# Patient Record
Sex: MALE | Race: WHITE | NOT HISPANIC OR LATINO | Employment: OTHER | URBAN - NONMETROPOLITAN AREA
[De-identification: names, ages, dates, MRNs, and addresses within clinical notes are randomized per-mention and may not be internally consistent; named-entity substitution may affect disease eponyms.]

---

## 2023-09-27 NOTE — PROGRESS NOTES
PT Evaluation     Today's date: 10/5/2023  Patient name: Zhane Coburn  : 1970  MRN: [id number]  Referring provider: JULIUS Engel  Dx:   Encounter Diagnosis     ICD-10-CM    1. Back pain with right-sided radiculopathy  M54.10       2. Abnormality of gait and mobility  R26.9                      Assessment  Assessment details: The patient is a 47 y/o male who presents to PT with diagnosis of LBP with R sided radiculopathy. He has complaints of pain in his lower back, R side > L side. He demonstrates deficits with decreased L/S and BLE ROM and strength, decreased postural awareness, decreased flexibility and decreased tolerance to doing his daily tasks at home. He recently began to ambulate with use of RW, prior to onset of back pain he ambulated without AD. He does need min A with use of RW and cues for safety. He had Downs Syndrome and his brother reports that the patient was previously able to do tasks such as self care after toileting, get out of the tub without assist and pull up his pants. His brother states that since his back pain started, the patient needs increased assist from family to complete these tasks. Tenderness was noted over R piriformis (pt would grimace with palpation), no tenderness noted along lower back. The patient would benefit from continued PT to address deficits and improve function. Tx to include ROM, stretching, strengthening, modalities, HEP, pt education, postural ed, lifting/body mechanics, neuro re-ed, balance/proprioception Te, MT and equipment. Impairments: abnormal or restricted ROM, activity intolerance, impaired physical strength, lacks appropriate home exercise program, pain with function, safety issue, poor posture  and poor body mechanics  Other impairment: decreased flexibility  Understanding of Dx/Px/POC: good   Prognosis: good    Goals  STGs:  1. Initiate and complete HEP with assist from family.   2.  Improve BLE strength by 1/2 grade in 4 weeks.  LTGs:  1. Patient to complete HEP consistently with family assist in 16 weeks. 2.  Improve L/S ROM to WNL t/o in 12 weeks to improve function. 3.  Improve B hip ROM to = in seated position in 12 weeks. 4.  Patient will be able to transfer I in/out of tub at home in 12 weeks. 5.  Patient will be able to I dress at home in 12 weeks. 6.  Patient will be able to complete self care after toileting at home in 12 weeks. 7. No TTP note over R piriformis (will be able to palpate without grimace) in 12 weeks. Plan  Plan details: Modalities and therapy interventions prn. Patient would benefit from: skilled physical therapy  Planned modality interventions: cryotherapy and thermotherapy: hydrocollator packs  Planned therapy interventions: abdominal trunk stabilization, IASTM, body mechanics training, neuromuscular re-education, self care, postural training, patient education, strengthening, stretching, therapeutic exercise, therapeutic activities, functional ROM exercises, flexibility, home exercise program, manual therapy, nerve gliding, gait training and transfer training  Frequency: 2x week  Duration in weeks: 12  Plan of Care beginning date: 10/5/2023  Plan of Care expiration date: 12/28/2023  Treatment plan discussed with: patient and family        Subjective Evaluation    History of Present Illness  Mechanism of injury: Patient with Down Syndrome - unable to report history - his brother is primary historian. The patient's brother states that the patient had L sided back pain awhile ago. He notes that the doctor had got him "straightened out" and his back had been feeling better. The patient's brother states that the patient developed back pain and he had a hard time moving. He had gone to see the chiropractor for two visits prior to going to the ER. He notes that he was adjusted twice.   His brother states that on 9/24 the patient had more pain and he was taken to the [facility] ER by ambulance. Per his brother they did a CT scan and the test was (-). He was sent home same day with Flexeral and Ibuprofen. He had a follow up with his PCP on 9/27/23. He was told to stop the Ibuprofen and use Tylenol. He was referred to OPPT and he now presents for his evaluation. He does not have a follow up appointment with his PCP scheduled at this time. He went to [facility] ER on 9/24/23. (His brother notes that the notes from the hospital are wrong, he was seen for his R side LBP). From EMR review of ER visit:  46 y.o. male with a PMH of down-syndrome presenting for left buttock pain s/p chiropractor manipulation. Patient initially was manipulated 2 weeks ago for chronic lower back pain with reported leg length discrepancy and then again last week. Although history is difficult to obtain for the patient secondary to his history of Down syndrome he is able to communicate in a limited manner and further history was obtained from his brother who he lives with. Patient has been ambulating with an antalgic gait secondary to left buttock discomfort. He points to his left buttock when asked where it hurts. Patient has not had any loss of bladder or bowel. Has not had any fevers. Per his brother:  The patient has complaints of pain, his brother notes that he sees tears in his eyes. He did give him a Flexeral last night around 6:00, no medicine since then. His brother notes that he has been having a hard time getting out of the tub and also unable to pull up his pants and perform self care after using the toilet. These activities are things that he was able to do prior to having the back pain. He goes to program during the day, four days a week, six hours a day. He hasn't been going since he developed lower back pain. Patient Goals  Patient goals for therapy: increased motion, decreased pain and increased strength  Patient goal: His brother wants him to return to PLOF at home. Pain  Location: LBP - unable to rate     Social Support  Steps to enter house: yes  4  Stairs in house: no   [facility]  Lives with: Brother     Employment status: not working    Diagnostic Tests  CT scan: abnormal (See above)    FCE comments: From EMR review of CT Scan from 9/24:  Findings: No acute fractures identified. There are multilevel spondylotic   changes. There is mild grade 1 L3-4 anterolisthesis. No significant soft tissue   pathology is identified. Treatments  Previous treatment: chiropractic        Objective     Palpation   Left   No palpable tenderness to the erector spinae and lumbar paraspinals. Right   No palpable tenderness to the erector spinae and lumbar paraspinals. Tenderness of the piriformis. Active Range of Motion     Additional Active Range of Motion Details  Seated:  Able to reach and touch toes. B Rotation limited about 50%. Seated:  Hip flexion limited R > L hip. Able to complete full knee extension in sitting. Hip strength grossly 3+ to 4-/5 t/o. Ambulation   Weight-Bearing Status   Assistive device used: two-wheeled walker    Additional Weight-Bearing Status Details  Presented to IE with RW today. Prior to back pain didn't use AD, as pain progressed he used HHA of family then his brother gave him a RW to use. Ambulation: Level Surfaces   Ambulation with assistive device: minimum assist    Additional Level Surfaces Ambulation Details  Cues for correct use of RW and min assist to help with maneuvering the walker.       Functional Assessment        Comments  Sit to Stand transfers: Independent  Supine to from sit: Min A                 Precautions: Down Syndrome 1:1 treatment - assist from therapist for completion of TE, Fall Risk        Manuals 10/5       B LE - Hamstring, Piriformis 10' ML                               Neuro Re-Ed         MTP/LTP        Sidestepping                                                Ther Ex        NuStep SLR x 3 - Stand        Bridges        LTR        SKTC        S/L Hip Abd        Clamshells        Supine SLR        DKTC w/PBall        Seated Flex Stretch w/PBall        Seated trunk rotations        Ther Activity        STS                Gait Training        Amb in // bars                Modalities        HP/CP prn                           Educated patient's brother to complete HEP/stretches at home. His brother demonstrated understanding of piriformis and hamstring stretches. Access Code: [email or web address]  URL: [email or web address]  Date: 10/05/2023  Prepared by: Jim Maher    Exercises  - Supine Piriformis Stretch with Foot on Ground  - 1 x daily - 7 x weekly - 1 sets - 10 reps - 10" hold  - Supine Hamstring Stretch  - 1 x daily - 7 x weekly - 1 sets - 10 reps - 10" hold

## 2023-10-05 ENCOUNTER — EVALUATION (OUTPATIENT)
Dept: PHYSICAL THERAPY | Facility: CLINIC | Age: 53
End: 2023-10-05
Payer: MEDICARE

## 2023-10-05 DIAGNOSIS — M54.10 BACK PAIN WITH RIGHT-SIDED RADICULOPATHY: Primary | ICD-10-CM

## 2023-10-05 DIAGNOSIS — R26.9 ABNORMALITY OF GAIT AND MOBILITY: ICD-10-CM

## 2023-10-05 PROCEDURE — 97535 SELF CARE MNGMENT TRAINING: CPT | Performed by: PHYSICAL THERAPIST

## 2023-10-05 PROCEDURE — 97161 PT EVAL LOW COMPLEX 20 MIN: CPT | Performed by: PHYSICAL THERAPIST

## 2023-10-05 PROCEDURE — 97140 MANUAL THERAPY 1/> REGIONS: CPT | Performed by: PHYSICAL THERAPIST

## 2023-10-05 NOTE — LETTER
2023    JULIUS Holley  [address] [facility]     Patient: Mo Randle   YOB: 1970   Date of Visit: 10/5/2023     Encounter Diagnosis     ICD-10-CM    1. Back pain with right-sided radiculopathy  M54.10       2. Abnormality of gait and mobility  R26.9           Dear Dr. Rayray Velazco: Thank you for your recent referral of Mo Randle. Please review the attached evaluation summary from Alfred's recent visit. Please verify that you agree with the plan of care by signing the attached order. If you have any questions or concerns, please do not hesitate to call. I sincerely appreciate the opportunity to share in the care of one of your patients and hope to have another opportunity to work with you in the near future. Sincerely,    Daphne Fernández, PT      Referring Provider:      I certify that I have read the below Plan of Care and certify the need for these services furnished under this plan of treatment while under my care. JULIUS Holley  [address] [place] [address]  Via Fax: [phone]          PT Evaluation     Today's date: 10/5/2023  Patient name: Mo Randle  : 1970  MRN: [id number]  Referring provider: JULIUS Holley  Dx:   Encounter Diagnosis     ICD-10-CM    1. Back pain with right-sided radiculopathy  M54.10       2. Abnormality of gait and mobility  R26.9                      Assessment  Assessment details: The patient is a 45 y/o male who presents to PT with diagnosis of LBP with R sided radiculopathy. He has complaints of pain in his lower back, R side > L side. He demonstrates deficits with decreased L/S and BLE ROM and strength, decreased postural awareness, decreased flexibility and decreased tolerance to doing his daily tasks at home. He recently began to ambulate with use of RW, prior to onset of back pain he ambulated without AD.   He does need min A with use of RW and cues for safety. He had Downs Syndrome and his brother reports that the patient was previously able to do tasks such as self care after toileting, get out of the tub without assist and pull up his pants. His brother states that since his back pain started, the patient needs increased assist from family to complete these tasks. Tenderness was noted over R piriformis (pt would grimace with palpation), no tenderness noted along lower back. The patient would benefit from continued PT to address deficits and improve function. Tx to include ROM, stretching, strengthening, modalities, HEP, pt education, postural ed, lifting/body mechanics, neuro re-ed, balance/proprioception Te, MT and equipment. Impairments: abnormal or restricted ROM, activity intolerance, impaired physical strength, lacks appropriate home exercise program, pain with function, safety issue, poor posture  and poor body mechanics  Other impairment: decreased flexibility  Understanding of Dx/Px/POC: good   Prognosis: good    Goals  STGs:  1. Initiate and complete HEP with assist from family. 2.  Improve BLE strength by 1/2 grade in 4 weeks. LTGs:  1. Patient to complete HEP consistently with family assist in 16 weeks. 2.  Improve L/S ROM to WNL t/o in 12 weeks to improve function. 3.  Improve B hip ROM to = in seated position in 12 weeks. 4.  Patient will be able to transfer I in/out of tub at home in 12 weeks. 5.  Patient will be able to I dress at home in 12 weeks. 6.  Patient will be able to complete self care after toileting at home in 12 weeks. 7. No TTP note over R piriformis (will be able to palpate without grimace) in 12 weeks. Plan  Plan details: Modalities and therapy interventions prn.     Patient would benefit from: skilled physical therapy  Planned modality interventions: cryotherapy and thermotherapy: hydrocollator packs  Planned therapy interventions: abdominal trunk stabilization, IASTM, body mechanics training, neuromuscular re-education, self care, postural training, patient education, strengthening, stretching, therapeutic exercise, therapeutic activities, functional ROM exercises, flexibility, home exercise program, manual therapy, nerve gliding, gait training and transfer training  Frequency: 2x week  Duration in weeks: 12  Plan of Care beginning date: 10/5/2023  Plan of Care expiration date: 12/28/2023  Treatment plan discussed with: patient and family        Subjective Evaluation    History of Present Illness  Mechanism of injury: Patient with Down Syndrome - unable to report history - his brother is primary historian. The patient's brother states that the patient had L sided back pain awhile ago. He notes that the doctor had got him "straightened out" and his back had been feeling better. The patient's brother states that the patient developed back pain and he had a hard time moving. He had gone to see the chiropractor for two visits prior to going to the ER. He notes that he was adjusted twice. His brother states that on 9/24 the patient had more pain and he was taken to the [facility] ER by ambulance. Per his brother they did a CT scan and the test was (-). He was sent home same day with Flexeral and Ibuprofen. He had a follow up with his PCP on 9/27/23. He was told to stop the Ibuprofen and use Tylenol. He was referred to OPPT and he now presents for his evaluation. He does not have a follow up appointment with his PCP scheduled at this time. He went to [facility] ER on 9/24/23. (His brother notes that the notes from the hospital are wrong, he was seen for his R side LBP). From EMR review of ER visit:  46 y.o. male with a PMH of down-syndrome presenting for left buttock pain s/p chiropractor manipulation. Patient initially was manipulated 2 weeks ago for chronic lower back pain with reported leg length discrepancy and then again last week.  Although history is difficult to obtain for the patient secondary to his history of Down syndrome he is able to communicate in a limited manner and further history was obtained from his brother who he lives with. Patient has been ambulating with an antalgic gait secondary to left buttock discomfort. He points to his left buttock when asked where it hurts. Patient has not had any loss of bladder or bowel. Has not had any fevers. Per his brother:  The patient has complaints of pain, his brother notes that he sees tears in his eyes. He did give him a Flexeral last night around 6:00, no medicine since then. His brother notes that he has been having a hard time getting out of the tub and also unable to pull up his pants and perform self care after using the toilet. These activities are things that he was able to do prior to having the back pain. He goes to program during the day, four days a week, six hours a day. He hasn't been going since he developed lower back pain. Patient Goals  Patient goals for therapy: increased motion, decreased pain and increased strength  Patient goal: His brother wants him to return to PLOF at home. Pain  Location: LBP - unable to rate     Social Support  Steps to enter house: yes  4  Stairs in house: no   [place] [facility] [place]  Lives with: Brother     Employment status: not working    Diagnostic Tests  CT scan: abnormal (See above)    FCE comments: From EMR review of CT Scan from 9/24:  Findings: No acute fractures identified. There are multilevel spondylotic   changes. There is mild grade 1 L3-4 anterolisthesis. No significant soft tissue   pathology is identified. Treatments  Previous treatment: chiropractic        Objective     Palpation   Left   No palpable tenderness to the erector spinae and lumbar paraspinals. Right   No palpable tenderness to the erector spinae and lumbar paraspinals. Tenderness of the piriformis.      Active Range of Motion     Additional Active Range of Motion Details  Seated:  Able to reach and touch toes. B Rotation limited about 50%. Seated:  Hip flexion limited R > L hip. Able to complete full knee extension in sitting. Hip strength grossly 3+ to 4-/5 t/o. Ambulation   Weight-Bearing Status   Assistive device used: two-wheeled walker    Additional Weight-Bearing Status Details  Presented to IE with RW today. Prior to back pain didn't use AD, as pain progressed he used HHA of family then his brother gave him a RW to use. Ambulation: Level Surfaces   Ambulation with assistive device: minimum assist    Additional Level Surfaces Ambulation Details  Cues for correct use of RW and min assist to help with maneuvering the walker. Functional Assessment        Comments  Sit to Stand transfers: Independent  Supine to from sit: Min A               Precautions: Down Syndrome 1:1 treatment - assist from therapist for completion of TE, Fall Risk        Manuals 10/5       B LE - Hamstring, Piriformis 10' ML                               Neuro Re-Ed         MTP/LTP        Sidestepping                                                Ther Ex        NuStep        SLR x 3 - Stand        Bridges        LTR        SKTC        S/L Hip Abd        Clamshells        Supine SLR        DKTC w/PBall        Seated Flex Stretch w/PBall        Seated trunk rotations        Ther Activity        STS                Gait Training        Amb in // bars                Modalities        HP/CP prn                         Educated patient's brother to complete HEP/stretches at home. His brother demonstrated understanding of piriformis and hamstring stretches. Access Code: [email or web address]  URL: [email or web address]  Date: 10/05/2023  Prepared by: Ok Jordan    Exercises  - Supine Piriformis Stretch with Foot on Ground  - 1 x daily - 7 x weekly - 1 sets - 10 reps - 10" hold  - Supine Hamstring Stretch  - 1 x daily - 7 x weekly - 1 sets - 10 reps - 10" hold

## 2023-10-09 ENCOUNTER — OFFICE VISIT (OUTPATIENT)
Dept: PHYSICAL THERAPY | Facility: CLINIC | Age: 53
End: 2023-10-09
Payer: MEDICARE

## 2023-10-09 DIAGNOSIS — R26.9 ABNORMALITY OF GAIT AND MOBILITY: ICD-10-CM

## 2023-10-09 DIAGNOSIS — M54.10 BACK PAIN WITH RIGHT-SIDED RADICULOPATHY: Primary | ICD-10-CM

## 2023-10-09 PROCEDURE — 97140 MANUAL THERAPY 1/> REGIONS: CPT | Performed by: PHYSICAL THERAPIST

## 2023-10-09 PROCEDURE — 97110 THERAPEUTIC EXERCISES: CPT | Performed by: PHYSICAL THERAPIST

## 2023-10-09 NOTE — PROGRESS NOTES
Daily Note     Today's date: 10/9/2023  Patient name: Cesia Simpson  : 1970  MRN: [id number]  Referring provider: JULIUS Kong  Dx:   Encounter Diagnosis     ICD-10-CM    1. Back pain with right-sided radiculopathy  M54.10       2. Abnormality of gait and mobility  R26.9           Start Time: 1600  Stop Time: 1700  Total time in clinic (min): 60 minutes    Subjective: Per care giver, patient has been doing more activities at home with less dependency. He is continuing to utilize the walker for ambulation. There is noted trendelenburg gait pattern noted during R stance phase of gait. Objective: See treatment diary below      Assessment: Tolerated treatment fair. Patient demonstrated fatigue post treatment and would benefit from continued PT Patient required direct supervision from the therapist for execution of all interventions today. He has noted weakness to the R hip and is unable to perform standing hip abduction to the L due to inability to bear full weight to the R LE for an extended period of time. Plan: Continue per plan of care. Progress treatment as tolerated. Precautions: Down Syndrome 1:1 treatment - assist from therapist for completion of TE, Fall Risk        Manuals 10/5 10/9      B LE - Hamstring, Piriformis 10' ML 30" 5x ea. Neuro Re-Ed         MTP/LTP        Sidestepping                                                Ther Ex        NuStep  L5 6 min      SLR x 3 - Stand        Bridges        LTR        SKTC        LAQ  5" 10x ea.        S/L Hip Abd  Seated OTB 2x10      Clamshells        Supine SLR        DKTC w/PBall        Seated Flex Stretch w/PBall  5" 10x      Seated trunk rotations        Ther Activity        STS  2x10              Gait Training        Amb in // bars  Gait training w/ rolling walker 150', 100'              Modalities        HP/CP prn

## 2023-10-12 ENCOUNTER — APPOINTMENT (OUTPATIENT)
Dept: RADIOLOGY | Facility: CLINIC | Age: 53
End: 2023-10-12
Payer: MEDICARE

## 2023-10-12 ENCOUNTER — OFFICE VISIT (OUTPATIENT)
Dept: PHYSICAL THERAPY | Facility: CLINIC | Age: 53
End: 2023-10-12
Payer: MEDICARE

## 2023-10-12 DIAGNOSIS — R26.9 ABNORMALITY OF GAIT AND MOBILITY: ICD-10-CM

## 2023-10-12 DIAGNOSIS — M54.10 BACK PAIN WITH RIGHT-SIDED RADICULOPATHY: Primary | ICD-10-CM

## 2023-10-12 DIAGNOSIS — M25.552 BILATERAL HIP PAIN: ICD-10-CM

## 2023-10-12 DIAGNOSIS — M25.551 BILATERAL HIP PAIN: ICD-10-CM

## 2023-10-12 PROCEDURE — 97112 NEUROMUSCULAR REEDUCATION: CPT | Performed by: PHYSICAL THERAPIST

## 2023-10-12 PROCEDURE — 97110 THERAPEUTIC EXERCISES: CPT | Performed by: PHYSICAL THERAPIST

## 2023-10-12 PROCEDURE — 73522 X-RAY EXAM HIPS BI 3-4 VIEWS: CPT

## 2023-10-12 PROCEDURE — 97140 MANUAL THERAPY 1/> REGIONS: CPT | Performed by: PHYSICAL THERAPIST

## 2023-10-12 PROCEDURE — 97116 GAIT TRAINING THERAPY: CPT | Performed by: PHYSICAL THERAPIST

## 2023-10-12 NOTE — PROGRESS NOTES
Daily Note     Today's date: 10/12/2023  Patient name: Rosa Nagel  : 1970  MRN: [id number]  Referring provider: JULIUS Sainz  Dx:   Encounter Diagnosis     ICD-10-CM    1. Back pain with right-sided radiculopathy  M54.10       2. Abnormality of gait and mobility  R26.9           Start Time: 09  Stop Time: 1000  Total time in clinic (min): 55 minutes    Subjective: Caregiver reports that patient was fatigued post last session. He continues to report pain to the R posterior hip and caregiver notes difficulties with R stance phase unassisted from the walker. Noted difficulties with tub transfers secondary to increased pain during R stance phase. Objective: See treatment diary below      Assessment: Tolerated treatment fair. Patient demonstrated fatigue post treatment, exhibited good technique with therapeutic exercises, and would benefit from continued PT Patient continues to report posterior R hip pain with prolonged sit to stand transfers and shows decreased stance time to the R LE during gait. He continues to utilize the walker for assistance with ambulation. Patient was challenged with progression of LE strengthening to the program today. Pain was localized to the posterior aspect of the R hip throughout the session today. Cuing required during gait training with rolling walker to increase step length and laurie along with staying inside the walker to help with assistance during gait. Plan: Continue per plan of care. Progress treatment as tolerated. Precautions: Down Syndrome 1:1 treatment - assist from therapist for completion of TE, Fall Risk        Manuals 10/5 10/9 10/12     B LE - Hamstring, Piriformis 10' ML 30" 5x ea. 30" 4x                             Neuro Re-Ed         MTP/LTP        Sidestepping                                                Ther Ex        NuStep  L5 6 min L4 6 min     SLR x 3 - Stand        Bridges        LTR        SKTC        LAQ  5" 10x ea. 2x10 ea.       S/L Hip Abd  Seated OTB 2x10 Seated OTB 2x10     Clamshells        Supine SLR        DKTC w/PBall        Seated Flex Stretch w/PBall  5" 10x 5" 10x     Seated trunk rotations   10x     Ther Activity        STS  2x10 2x10             Gait Training        Amb in // bars  Gait training w/ rolling walker 150', 100' W/ RW x 250'             Modalities        HP/CP prn

## 2023-10-16 ENCOUNTER — OFFICE VISIT (OUTPATIENT)
Dept: PHYSICAL THERAPY | Facility: CLINIC | Age: 53
End: 2023-10-16
Payer: MEDICARE

## 2023-10-16 DIAGNOSIS — M54.10 BACK PAIN WITH RIGHT-SIDED RADICULOPATHY: Primary | ICD-10-CM

## 2023-10-16 DIAGNOSIS — R26.9 ABNORMALITY OF GAIT AND MOBILITY: ICD-10-CM

## 2023-10-16 PROCEDURE — 97140 MANUAL THERAPY 1/> REGIONS: CPT | Performed by: PHYSICAL THERAPIST

## 2023-10-16 PROCEDURE — 97110 THERAPEUTIC EXERCISES: CPT | Performed by: PHYSICAL THERAPIST

## 2023-10-16 NOTE — PROGRESS NOTES
Daily Note     Today's date: 10/16/2023  Patient name: Eneida Luke  : 1970  MRN: [id number]  Referring provider: JULIUS Jaramillo  Dx:   Encounter Diagnosis     ICD-10-CM    1. Back pain with right-sided radiculopathy  M54.10       2. Abnormality of gait and mobility  R26.9           Start Time: 1300  Stop Time: 1400  Total time in clinic (min): 60 minutes    Subjective: Patient's caregiver is reporting decreased mobility and increased pain over the past week. The patient is expressing increased pain with standing and walking and entering therapy via WC today. Objective: See treatment diary below      Assessment: Tolerated treatment poor. Patient demonstrated fatigue post treatment and exhibited good technique with therapeutic exercises Patient tolerated seated and supine exercises well today but had difficulties tolerating sitting off edge of table along with standing/walking activities throughout the session. His caregivers posed the question of further evaluation secondary to increasing pain which I am in agreement with due to increased pain and decreased function. Plan: Progress treatment as tolerated. Precautions: Down Syndrome 1:1 treatment - assist from therapist for completion of TE, Fall Risk        Manuals 10/5 10/9 10/12 10/16    B LE - Hamstring, Piriformis, hip flexion 10' ML 30" 5x ea. 30" 4x 25 min                            Neuro Re-Ed         MTP/LTP        Sidestepping                                                Ther Ex        NuStep  L5 6 min L4 6 min L4 5 min    SLR x 3 - Stand        Bridges    15x    LTR    5" 10x    SKTC        LAQ  5" 10x ea. 2x10 ea.   15x    S/L Hip Abd  Seated OTB 2x10 Seated OTB 2x10     Clamshells        Supine SLR        DKTC w/PBall        Seated Flex Stretch w/PBall  5" 10x 5" 10x     Seated trunk rotations   10x     Ther Activity        STS  2x10 2x10             Gait Training        Amb in // bars  Gait training w/ rolling walker 150', 100' W/ RW x 250'             Modalities        HP/CP prn

## 2023-10-19 ENCOUNTER — APPOINTMENT (OUTPATIENT)
Dept: PHYSICAL THERAPY | Facility: CLINIC | Age: 53
End: 2023-10-19
Payer: MEDICARE

## 2023-10-24 ENCOUNTER — OFFICE VISIT (OUTPATIENT)
Dept: PHYSICAL THERAPY | Facility: CLINIC | Age: 53
End: 2023-10-24
Payer: MEDICARE

## 2023-10-24 DIAGNOSIS — R26.9 ABNORMALITY OF GAIT AND MOBILITY: Primary | ICD-10-CM

## 2023-10-24 PROCEDURE — 97116 GAIT TRAINING THERAPY: CPT

## 2023-10-24 PROCEDURE — 97530 THERAPEUTIC ACTIVITIES: CPT

## 2023-10-24 PROCEDURE — 97110 THERAPEUTIC EXERCISES: CPT

## 2023-10-24 NOTE — PROGRESS NOTES
Daily Note     Today's date: 10/24/2023  Patient name: Candy Dennison  : 1970  MRN: [id number]  Referring provider: JULIUS Brown  Dx:   Encounter Diagnosis     ICD-10-CM    1. Abnormality of gait and mobility  R26.9           Start Time: 1500  Stop Time: 1600  Total time in clinic (min): 60 minutes    Subjective: pt father states that he is trying to get him to walk at home,.but he doesn't want to walk much. Objective: See treatment diary below      Assessment: Tolerated treatment fair. Patient would benefit from continued PT   pt was able to walk 3 6 times at about 100 ft each. We used a roller walker with assist of 2, we also worked on standing tolerance and some seated leg exercises. Plan: Continue per plan of care. Precautions: Down Syndrome 1:1 treatment - assist from therapist for completion of TE, Fall Risk        Manuals 10/5 10/9 10/12 10/16 10/24   B LE - Hamstring, Piriformis, hip flexion 10' ML 30" 5x ea. 30" 4x 25 min                            Neuro Re-Ed         MTP/LTP        Sidestepping                                                Ther Ex        NuStep  L5 6 min L4 6 min L4 5 min L 4 5 min   SLR x 3 - Stand        Bridges    15x    LTR    5" 10x    SKTC        LAQ  5" 10x ea. 2x10 ea.   15x 30 x each   S/L Hip Abd  Seated OTB 2x10 Seated OTB 2x10     Clamshells        Supine SLR        DKTC w/PBall        Seated Flex Stretch w/PBall  5" 10x 5" 10x     Seated trunk rotations   10x     Ther Activity        STS  2x10 2x10  10 x   Standing       3 times at one min each   Gait Training        Amb in // bars  Gait training w/ rolling walker 150', 100' W/ RW x 250'  With RW x 300 ft           Modalities        HP/CP prn

## 2023-11-02 ENCOUNTER — OFFICE VISIT (OUTPATIENT)
Dept: PHYSICAL THERAPY | Facility: CLINIC | Age: 53
End: 2023-11-02
Payer: MEDICARE

## 2023-11-02 DIAGNOSIS — M54.10 BACK PAIN WITH RIGHT-SIDED RADICULOPATHY: ICD-10-CM

## 2023-11-02 DIAGNOSIS — R26.9 ABNORMALITY OF GAIT AND MOBILITY: Primary | ICD-10-CM

## 2023-11-02 PROCEDURE — 97110 THERAPEUTIC EXERCISES: CPT

## 2023-11-02 PROCEDURE — 97530 THERAPEUTIC ACTIVITIES: CPT

## 2023-11-02 PROCEDURE — 97116 GAIT TRAINING THERAPY: CPT

## 2023-11-02 NOTE — PROGRESS NOTES
Daily Note     Today's date: 2023  Patient name: Barry Goltz  : 1970  MRN: [id number]  Referring provider: JULIUS Vaughn  Dx:   Encounter Diagnosis     ICD-10-CM    1. Abnormality of gait and mobility  R26.9       2. Back pain with right-sided radiculopathy  M54.10           Start Time: 1600  Stop Time: 1645  Total time in clinic (min): 45 minutes    Subjective: Pt returns from MD F/U ; they ordered an MRI. Objective: See treatment diary below      Assessment: Tolerated treatment fair. Patient demonstrated fatigue post treatment. Increased sandy to walking until fatigue. Episode of LBP with stand to sit. Plan: Continue per plan of care. Precautions: Down Syndrome 1:1 treatment - assist from therapist for completion of TE, Fall Risk        Manuals 11/2 10/9 10/12 10/16 10/24   B LE - Hamstring, Piriformis, hip flexion np 30" 5x ea. 30" 4x 25 min            Neuro Re-Ed         Sidestepping                                        Ther Ex        NuStep 5m L5 L5 6 min L4 6 min L4 5 min L 4 5 min   Bridges    15x    LTR    5" 10x    SKTC        LAQ 10x  5" 10x ea. 2x10 ea.   15x 30 x each   Hip Abd L2 tb 10x  Seated OTB 2x10 Seated OTB 2x10     Clamshells        Supine SLR        DKTC w/PBall        Seated Flex Stretch w/PBall 10x 5" 5" 10x 5" 10x     Seated trunk rotations   10x     Ther Activity        STS 10x  2x10 2x10  10 x   Standing   Dance 3x    3 times at one min each   Gait Training        Amb in // bars RW 3 x 100ft Gait training w/ rolling walker 150', 100' W/ RW x 250'  With RW x 300 ft           Modalities        HP/CP prn

## 2023-11-06 ENCOUNTER — OFFICE VISIT (OUTPATIENT)
Dept: PHYSICAL THERAPY | Facility: CLINIC | Age: 53
End: 2023-11-06
Payer: MEDICARE

## 2023-11-06 DIAGNOSIS — M54.10 BACK PAIN WITH RIGHT-SIDED RADICULOPATHY: ICD-10-CM

## 2023-11-06 DIAGNOSIS — R26.9 ABNORMALITY OF GAIT AND MOBILITY: Primary | ICD-10-CM

## 2023-11-06 PROCEDURE — 97110 THERAPEUTIC EXERCISES: CPT | Performed by: PHYSICAL THERAPIST

## 2023-11-06 PROCEDURE — 97140 MANUAL THERAPY 1/> REGIONS: CPT | Performed by: PHYSICAL THERAPIST

## 2023-11-06 PROCEDURE — 97116 GAIT TRAINING THERAPY: CPT | Performed by: PHYSICAL THERAPIST

## 2023-11-06 PROCEDURE — 97530 THERAPEUTIC ACTIVITIES: CPT | Performed by: PHYSICAL THERAPIST

## 2023-11-06 NOTE — PROGRESS NOTES
Daily Note     Today's date: 2023  Patient name: Carlito Mondragon  : 1970  MRN: [id number]  Referring provider: JULIUS Luke  Dx:   Encounter Diagnosis     ICD-10-CM    1. Abnormality of gait and mobility  R26.9       2. Back pain with right-sided radiculopathy  M54.10           Start Time: 1600  Stop Time: 1700  Total time in clinic (min): 60 minutes    Subjective: Patient's caregiver reports that he continues to be limited with mobility at home and reports increased R low back and hip pain with WB activities. He also reports that it is hard to have the patient participate in activity at home. Objective: See treatment diary below      Assessment: Tolerated treatment fair. Patient exhibited good technique with therapeutic exercises and would benefit from continued PT Patient had his best session to date today. He demonstrated improved step length and laurie during gait training and had better tolerance for passive stretching and strengthening interventions today. Plan: Continue per plan of care. Progress treatment as tolerated. Precautions: Down Syndrome 1:1 treatment - assist from therapist for completion of TE, Fall Risk        Manuals 11/2 11/6 10/12 10/16 10/24   B LE - Hamstring, Piriformis, hip flexion np 15 min 30" 4x 25 min            Neuro Re-Ed         Sidestepping                                        Ther Ex        NuStep 5m L5 L5 8 min L4 6 min L4 5 min L 4 5 min   Bridges  15x  15x    LTR    5" 10x    SKTC        LAQ 10x  20x ea. 2x10 ea. 15x 30 x each   Hip Abd L2 tb 10x  MRE 15x ea.   Seated OTB 2x10     Clamshells        Supine SLR        DKTC w/PBall        Seated Flex Stretch w/PBall 10x 5"  5" 10x     Seated trunk rotations   10x     Ther Activity        STS 10x  15x 2x10  10 x   Standing   Dance 3x    3 times at one min each   Gait Training        Gait training w/ RW RW 3 x 100ft 100 ft 3x W/ RW x 250'  With RW x 300 ft           Modalities        HP/CP prn

## 2023-11-09 ENCOUNTER — OFFICE VISIT (OUTPATIENT)
Dept: PHYSICAL THERAPY | Facility: CLINIC | Age: 53
End: 2023-11-09
Payer: MEDICARE

## 2023-11-09 DIAGNOSIS — R26.9 ABNORMALITY OF GAIT AND MOBILITY: Primary | ICD-10-CM

## 2023-11-09 DIAGNOSIS — M54.10 BACK PAIN WITH RIGHT-SIDED RADICULOPATHY: ICD-10-CM

## 2023-11-09 PROCEDURE — 97116 GAIT TRAINING THERAPY: CPT | Performed by: PHYSICAL THERAPIST

## 2023-11-09 PROCEDURE — 97140 MANUAL THERAPY 1/> REGIONS: CPT | Performed by: PHYSICAL THERAPIST

## 2023-11-09 PROCEDURE — 97530 THERAPEUTIC ACTIVITIES: CPT | Performed by: PHYSICAL THERAPIST

## 2023-11-09 PROCEDURE — 97110 THERAPEUTIC EXERCISES: CPT | Performed by: PHYSICAL THERAPIST

## 2023-11-09 NOTE — PROGRESS NOTES
Daily Note     Today's date: 2023  Patient name: Zully Terry  : 1970  MRN: [id number]  Referring provider: JULIUS Arellano  Dx:   Encounter Diagnosis     ICD-10-CM    1. Abnormality of gait and mobility  R26.9       2. Back pain with right-sided radiculopathy  M54.10           Start Time: 1600  Stop Time: 1700  Total time in clinic (min): 60 minutes    Subjective: Patient's caregiver reports that the patient has been unmotivated to perform mobility exercises at home. He continues to report low back pain with walking and requests the wheelchair to which the caregiver does provide for transportation. Objective: See treatment diary below      Assessment: Tolerated treatment fair. Patient demonstrated fatigue post treatment, exhibited good technique with therapeutic exercises, and would benefit from continued PT Patient had better tolerance for therapy interventions today with improving mobility noted during passive stretching. He is able to tolerate standing interventions and gait training with less encouragement. Plan: Continue per plan of care. Progress treatment as tolerated. Precautions: Down Syndrome 1:1 treatment - assist from therapist for completion of TE, Fall Risk        Manuals 11/2 11/6 11/9 10/16 10/24   B LE - Hamstring, Piriformis, hip flexion np 15 min 15 min 25 min            Neuro Re-Ed                                         Ther Ex        NuStep 5m L5 L5 8 min L6 8 min L4 5 min L 4 5 min   Bridges  15x 15x 15x    LTR    5" 10x    SKTC        LAQ 10x  20x ea. 20x ea. 15x 30 x each   Hip Abd L2 tb 10x  MRE 15x ea. 15x ea.       Clamshells        Supine SLR   15x ea     DKTC w/PBall        Seated Flex Stretch w/PBall 10x 5"       Seated trunk rotations        Ther Activity        STS 10x  15x 15x  10 x   Side stepping in // bars   2 laps     Standing   Dance 3x    3 times at one min each   Gait Training        Gait training w/ RW RW 3 x 100ft 100 ft 3x 100 ft 2x  With RW x 300 ft           Modalities        HP/CP prn

## 2023-11-13 ENCOUNTER — OFFICE VISIT (OUTPATIENT)
Dept: PHYSICAL THERAPY | Facility: CLINIC | Age: 53
End: 2023-11-13
Payer: MEDICARE

## 2023-11-13 DIAGNOSIS — R26.9 ABNORMALITY OF GAIT AND MOBILITY: Primary | ICD-10-CM

## 2023-11-13 DIAGNOSIS — M54.10 BACK PAIN WITH RIGHT-SIDED RADICULOPATHY: ICD-10-CM

## 2023-11-13 PROCEDURE — 97530 THERAPEUTIC ACTIVITIES: CPT | Performed by: PHYSICAL THERAPIST

## 2023-11-13 PROCEDURE — 97140 MANUAL THERAPY 1/> REGIONS: CPT | Performed by: PHYSICAL THERAPIST

## 2023-11-13 PROCEDURE — 97116 GAIT TRAINING THERAPY: CPT | Performed by: PHYSICAL THERAPIST

## 2023-11-13 PROCEDURE — 97110 THERAPEUTIC EXERCISES: CPT | Performed by: PHYSICAL THERAPIST

## 2023-11-13 NOTE — LETTER
2023    [facility] [address]     Patient: Melissa Alfred   YOB: 1970   Date of Visit: 2023     Encounter Diagnosis     ICD-10-CM    1. Abnormality of gait and mobility  R26.9       2. Back pain with right-sided radiculopathy  M54.10           Dear Dr. Kelsi Contreras: Thank you for your recent referral of Melissa Alfred. Please review the attached evaluation summary from Alfred's recent visit. Please verify that you agree with the plan of care by signing the attached order. If you have any questions or concerns, please do not hesitate to call. I sincerely appreciate the opportunity to share in the care of one of your patients and hope to have another opportunity to work with you in the near future. Sincerely,    Kristian Brown, PT      Referring Provider:      I certify that I have read the below Plan of Care and certify the need for these services furnished under this plan of treatment while under my care. [facility] [address]  Via Fax: [phone]          PT Re-Evaluation     Today's date: 2023  Patient name: Melissa Alfred  : 1970  MRN: [id number]  Referring provider: JULIUS Castillo  Dx:   Encounter Diagnosis     ICD-10-CM    1. Abnormality of gait and mobility  R26.9       2. Back pain with right-sided radiculopathy  M54.10           Start Time: 1500  Stop Time: 1600  Total time in clinic (min): 60 minutes    Assessment  Assessment details: Patient has attended 9 physical therapy visits to date. The only reports of pain are noted during sit to supine transfer. He did well with addition of the recumbent bike today but required assist x2 to get onto and off of the machine. He continues to require verbal encouragement and cuing to improve step length B/L There is noted weakness during L stance phase with trendelenburg gait pattern observed. Impairments: abnormal or restricted ROM, activity intolerance, impaired physical strength, lacks appropriate home exercise program, pain with function, safety issue, poor posture  and poor body mechanics  Other impairment: decreased flexibility    Symptom irritability: lowUnderstanding of Dx/Px/POC: good   Prognosis: good    Goals  STGs:  1. Initiate and complete HEP with assist from family. - Not MET  2. Improve BLE strength by 1/2 grade in 4 weeks. - MET  LTGs:  1. Patient to complete HEP consistently with family assist in 16 weeks. - Not MET  2. Improve L/S ROM to WNL t/o in 12 weeks to improve function. - Progressing  3. Improve B hip ROM to = in seated position in 12 weeks. - Progressing  4. Patient will be able to transfer I in/out of tub at home in 12 weeks. - Not MET  5. Patient will be able to I dress at home in 12 weeks. - Partially MET  6. Patient will be able to complete self care after toileting at home in 12 weeks. - Partially MET  7. No TTP note over R piriformis (will be able to palpate without grimace) in 12 weeks. - MET      Plan  Plan details: Modalities and therapy interventions prn.     Patient would benefit from: skilled physical therapy  Planned modality interventions: cryotherapy and thermotherapy: hydrocollator packs  Planned therapy interventions: abdominal trunk stabilization, IASTM, body mechanics training, neuromuscular re-education, self care, postural training, patient education, strengthening, stretching, therapeutic exercise, therapeutic activities, functional ROM exercises, flexibility, home exercise program, manual therapy, nerve gliding, gait training and transfer training  Frequency: 2x week  Duration in weeks: 6  Plan of Care beginning date: 11/13/2023  Plan of Care expiration date: 12/25/2023  Treatment plan discussed with: patient and family        Subjective Evaluation    History of Present Illness  Mechanism of injury: Patient with Down Syndrome - unable to report history - his brother is primary historian. The patient's brother states that the patient had L sided back pain awhile ago. He notes that the doctor had got him "straightened out" and his back had been feeling better. The patient's brother states that the patient developed back pain and he had a hard time moving. He had gone to see the chiropractor for two visits prior to going to the ER. He notes that he was adjusted twice. His brother states that on 9/24 the patient had more pain and he was [facility] [place] [facility]. Per his brother they did a CT scan and the test was (-). He was sent home same day with Flexeral and Ibuprofen. He had a follow up with his PCP on 9/27/23. He was told to stop the Ibuprofen and use Tylenol. He was referred to OPPT and he now presents for his evaluation. He does not have a follow up appointment with his PCP scheduled at this time. He went to [facility] ER on 9/24/23. (His brother notes that the notes from the hospital are wrong, he was seen for his R side LBP). From EMR review of ER visit:  46 y.o. male with a PMH of down-syndrome presenting for left buttock pain s/p chiropractor manipulation. Patient initially was manipulated 2 weeks ago for chronic lower back pain with reported leg length discrepancy and then again last week. Although history is difficult to obtain for the patient secondary to his history of Down syndrome he is able to communicate in a limited manner and further history was obtained from his brother who he lives with. Patient has been ambulating with an antalgic gait secondary to left buttock discomfort. He points to his left buttock when asked where it hurts. Patient has not had any loss of bladder or bowel. Has not had any fevers. Per his brother:  The patient has complaints of pain, his brother notes that he sees tears in his eyes. He did give him a Flexeral last night around 6:00, no medicine since then.   His brother notes that he has been having a hard time getting out of the tub and also unable to pull up his pants and perform self care after using the toilet. These activities are things that he was able to do prior to having the back pain. He goes to program during the day, four days a week, six hours a day. He hasn't been going since he developed lower back pain. Update 11/13/2023:  Patient continues with reports of intermittent low back and buttock pain. His brother is reporting continued limitation with standing and walking activities secondary to patient's reports of increased pain and self restriction. He is schedule for an MRI on Wednesday and will be following up with the spine physician on Thursday. Patient Goals  Patient goals for therapy: increased motion, decreased pain and increased strength  Patient goal: His brother wants him to return to PLOF at home. Pain  Location: LBP - unable to rate     Social Support  Steps to enter house: yes  4  Stairs in house: no   Lives in: [facility] house  Lives with: Brother     Employment status: not working    Diagnostic Tests  CT scan: abnormal (See above)    FCE comments: From EMR review of CT Scan from 9/24:  Findings: No acute fractures identified. There are multilevel spondylotic   changes. There is mild grade 1 L3-4 anterolisthesis. No significant soft tissue   pathology is identified. Treatments  Previous treatment: chiropractic        Objective     Palpation   Left   No palpable tenderness to the erector spinae and lumbar paraspinals. Right   No palpable tenderness to the erector spinae and lumbar paraspinals. Tenderness of the piriformis. Active Range of Motion     Additional Active Range of Motion Details  Seated:  Able to reach and touch toes. B Rotation limited about 25%. Seated:  Hip flexion limited R > L hip. Able to complete full knee extension in sitting. Hip strength grossly 4/5 t/o.       Ambulation   Weight-Bearing Status   Assistive device used: two-wheeled walker and wheelchair    Additional Weight-Bearing Status Details  Presented to therapy with WC today. Prior to back pain didn't use AD, as pain progressed he used HHA of family then his brother gave him a RW to use. Ambulation: Level Surfaces   Ambulation with assistive device: minimum assist    Additional Level Surfaces Ambulation Details  Cues for correct use of RW and min assist to help with maneuvering the walker. Observational Gait   Increased right stance time. Decreased left stance time. Functional Assessment        Comments  Sit to Stand transfers: Independent  Supine to from sit: Min A                 Precautions: Down Syndrome 1:1 treatment - assist from therapist for completion of TE, Fall Risk           Manuals 11/2 11/6 11/9 11/13 Reassess 10/24   B LE - Hamstring, Piriformis, hip flexion np 15 min 15 min 15 min            Neuro Re-Ed                                         Ther Ex        NuStep for strengthening 5m L5 L5 8 min L6 8 min L6 10 min L 4 5 min   Bike for strengthening    5 min    Bridges  15x 15x 15x    LTR        SKTC        LAQ 10x  20x ea. 20x ea. 30 x each   Hip Abd L2 tb 10x  MRE 15x ea. 15x ea.       Clamshells        Supine SLR   15x ea     DKTC w/PBall        Seated Flex Stretch w/PBall 10x 5"       Seated trunk rotations        Ther Activity        STS 10x  15x 15x 10x 10 x   Side stepping in // bars   2 laps 2 laps    Standing   Dance 3x    3 times at one min each   Gait Training        Gait training w/ RW RW 3 x 100ft 100 ft 3x 100 ft 2x 100ft x1 With RW x 300 ft           Modalities        HP/CP prn

## 2023-11-13 NOTE — PROGRESS NOTES
PT Re-Evaluation     Today's date: 2023  Patient name: Jorge Schneider  : 1970  MRN: [id number]  Referring provider: JULIUS Tirado  Dx:   Encounter Diagnosis     ICD-10-CM    1. Abnormality of gait and mobility  R26.9       2. Back pain with right-sided radiculopathy  M54.10           Start Time: 1500  Stop Time: 1600  Total time in clinic (min): 60 minutes    Assessment  Assessment details: Patient has attended 9 physical therapy visits to date. The only reports of pain are noted during sit to supine transfer. He did well with addition of the recumbent bike today but required assist x2 to get onto and off of the machine. He continues to require verbal encouragement and cuing to improve step length B/L There is noted weakness during L stance phase with trendelenburg gait pattern observed. Impairments: abnormal or restricted ROM, activity intolerance, impaired physical strength, lacks appropriate home exercise program, pain with function, safety issue, poor posture  and poor body mechanics  Other impairment: decreased flexibility    Symptom irritability: lowUnderstanding of Dx/Px/POC: good   Prognosis: good    Goals  STGs:  1. Initiate and complete HEP with assist from family. - Not MET  2. Improve BLE strength by 1/2 grade in 4 weeks. - MET  LTGs:  1. Patient to complete HEP consistently with family assist in 16 weeks. - Not MET  2. Improve L/S ROM to WNL t/o in 12 weeks to improve function. - Progressing  3. Improve B hip ROM to = in seated position in 12 weeks. - Progressing  4. Patient will be able to transfer I in/out of tub at home in 12 weeks. - Not MET  5. Patient will be able to I dress at home in 12 weeks. - Partially MET  6. Patient will be able to complete self care after toileting at home in 12 weeks. - Partially MET  7. No TTP note over R piriformis (will be able to palpate without grimace) in 12 weeks.   - MET      Plan  Plan details: Modalities and therapy interventions prn.    Patient would benefit from: skilled physical therapy  Planned modality interventions: cryotherapy and thermotherapy: hydrocollator packs  Planned therapy interventions: abdominal trunk stabilization, IASTM, body mechanics training, neuromuscular re-education, self care, postural training, patient education, strengthening, stretching, therapeutic exercise, therapeutic activities, functional ROM exercises, flexibility, home exercise program, manual therapy, nerve gliding, gait training and transfer training  Frequency: 2x week  Duration in weeks: 6  Plan of Care beginning date: 11/13/2023  Plan of Care expiration date: 12/25/2023  Treatment plan discussed with: patient and family        Subjective Evaluation    History of Present Illness  Mechanism of injury: Patient with Down Syndrome - unable to report history - his brother is primary historian. The patient's brother states that the patient had L sided back pain awhile ago. He notes that the doctor had got him "straightened out" and his back had been feeling better. The patient's brother states that the patient developed back pain and he had a hard time moving. He had gone to see the chiropractor for two visits prior to going to the ER. He notes that he was adjusted twice. His brother states that on 9/24 the patient had more pain and he was taken to the [facility] ER by ambulance. Per his brother they did a CT scan and the test was (-). He was sent home same day with Flexeral and Ibuprofen. He had a follow up with his PCP on 9/27/23. He was told to stop the Ibuprofen and use Tylenol. He was referred to OPPT and he now presents for his evaluation. He does not have a follow up appointment with his PCP scheduled at this time. He went to [facility] ER on 9/24/23. (His brother notes that the notes from the hospital are wrong, he was seen for his R side LBP).     From EMR review of ER visit:  46 y.o. male with a PMH of down-syndrome presenting for left buttock pain s/p chiropractor manipulation. Patient initially was manipulated 2 weeks ago for chronic lower back pain with reported leg length discrepancy and then again last week. Although history is difficult to obtain for the patient secondary to his history of Down syndrome he is able to communicate in a limited manner and further history was obtained from his brother who he lives with. Patient has been ambulating with an antalgic gait secondary to left buttock discomfort. He points to his left buttock when asked where it hurts. Patient has not had any loss of bladder or bowel. Has not had any fevers. Per his brother:  The patient has complaints of pain, his brother notes that he sees tears in his eyes. He did give him a Flexeral last night around 6:00, no medicine since then. His brother notes that he has been having a hard time getting out of the tub and also unable to pull up his pants and perform self care after using the toilet. These activities are things that he was able to do prior to having the back pain. He goes to program during the day, four days a week, six hours a day. He hasn't been going since he developed lower back pain. Update 11/13/2023:  Patient continues with reports of intermittent low back and buttock pain. His brother is reporting continued limitation with standing and walking activities secondary to patient's reports of increased pain and self restriction. He is schedule for an MRI on Wednesday and will be following up with the spine physician on Thursday. Patient Goals  Patient goals for therapy: increased motion, decreased pain and increased strength  Patient goal: His brother wants him to return to PLOF at home.     Pain  Location: LBP - unable to rate     Social Support  Steps to enter house: yes  4  Stairs in house: no   [place] [facility]  Lives with: Brother     Employment status: not working    Diagnostic Tests  CT scan: abnormal (See above)    FCE comments: From EMR review of CT Scan from 9/24:  Findings: No acute fractures identified. There are multilevel spondylotic   changes. There is mild grade 1 L3-4 anterolisthesis. No significant soft tissue   pathology is identified. Treatments  Previous treatment: chiropractic        Objective     Palpation   Left   No palpable tenderness to the erector spinae and lumbar paraspinals. Right   No palpable tenderness to the erector spinae and lumbar paraspinals. Tenderness of the piriformis. Active Range of Motion     Additional Active Range of Motion Details  Seated:  Able to reach and touch toes. B Rotation limited about 25%. Seated:  Hip flexion limited R > L hip. Able to complete full knee extension in sitting. Hip strength grossly 4/5 t/o. Ambulation   Weight-Bearing Status   Assistive device used: two-wheeled walker and wheelchair    Additional Weight-Bearing Status Details  Presented to therapy with WC today. Prior to back pain didn't use AD, as pain progressed he used HHA of family then his brother gave him a RW to use. Ambulation: Level Surfaces   Ambulation with assistive device: minimum assist    Additional Level Surfaces Ambulation Details  Cues for correct use of RW and min assist to help with maneuvering the walker. Observational Gait   Increased right stance time. Decreased left stance time.      Functional Assessment        Comments  Sit to Stand transfers: Independent  Supine to from sit: Min A                 Precautions: Down Syndrome 1:1 treatment - assist from therapist for completion of TE, Fall Risk           Manuals 11/2 11/6 11/9 11/13 Reassess 10/24   B LE - Hamstring, Piriformis, hip flexion np 15 min 15 min 15 min            Neuro Re-Ed                                         Ther Ex        NuStep for strengthening 5m L5 L5 8 min L6 8 min L6 10 min L 4 5 min   Bike for strengthening    5 min    Bridges  15x 15x 15x    LTR        SKTC        LAQ 10x  20x ea. 20x ea. 30 x each   Hip Abd L2 tb 10x  MRE 15x ea. 15x ea.       Clamshells        Supine SLR   15x ea     DKTC w/PBall        Seated Flex Stretch w/PBall 10x 5"       Seated trunk rotations        Ther Activity        STS 10x  15x 15x 10x 10 x   Side stepping in // bars   2 laps 2 laps    Standing   Dance 3x    3 times at one min each   Gait Training        Gait training w/ RW RW 3 x 100ft 100 ft 3x 100 ft 2x 100ft x1 With RW x 300 ft           Modalities        HP/CP prn

## 2023-11-16 ENCOUNTER — APPOINTMENT (OUTPATIENT)
Dept: PHYSICAL THERAPY | Facility: CLINIC | Age: 53
End: 2023-11-16
Payer: MEDICARE

## 2023-11-20 ENCOUNTER — OFFICE VISIT (OUTPATIENT)
Dept: PHYSICAL THERAPY | Facility: CLINIC | Age: 53
End: 2023-11-20
Payer: MEDICARE

## 2023-11-20 DIAGNOSIS — M54.10 BACK PAIN WITH RIGHT-SIDED RADICULOPATHY: Primary | ICD-10-CM

## 2023-11-20 DIAGNOSIS — R26.9 ABNORMALITY OF GAIT AND MOBILITY: ICD-10-CM

## 2023-11-20 PROCEDURE — 97110 THERAPEUTIC EXERCISES: CPT

## 2023-11-20 PROCEDURE — 97116 GAIT TRAINING THERAPY: CPT

## 2023-11-20 PROCEDURE — 97530 THERAPEUTIC ACTIVITIES: CPT

## 2023-11-20 NOTE — PROGRESS NOTES
Daily Note     Today's date: 2023  Patient name: Shonda Gómez  : 1970  MRN: [id number]  Referring provider: JULIUS Lozoya  Dx:   Encounter Diagnosis     ICD-10-CM    1. Back pain with right-sided radiculopathy  M54.10       2. Abnormality of gait and mobility  R26.9           Start Time: 1600  Stop Time: 1700  Total time in clinic (min): 60 minutes    Subjective: he continues to use a walker for ambulation. Objective: See treatment diary below      Assessment: Tolerated treatment fair. Patient would benefit from continued PT   pt was able to walk 4 x today at 100 feet each. He reports having some pain with passive hamstring stretching on his left , so we stopped. He was hold his glut and said he had pain in his back, we continue to work on his strength and endurance. Plan: Continue per plan of care. Precautions: Down Syndrome 1:1 treatment - assist from therapist for completion of TE, Fall Risk           Manuals  Reassess    B LE - Hamstring, Piriformis, hip flexion np 15 min 15 min 15 min 8 min           Neuro Re-Ed                                         Ther Ex        NuStep for strengthening 5m L5 L5 8 min L6 8 min L6 10 min L 4 5 min   Bike for strengthening    5 min    Bridges  15x 15x 15x 20 x   LTR        SKTC        LAQ 10x  20x ea. 20x ea. 30 x each   Hip Abd L2 tb 10x  MRE 15x ea. 15x ea.    15 x    Clamshells        Supine SLR   15x ea  10 x   DKTC w/PBall        Seated Flex Stretch w/PBall 10x 5"       Seated trunk rotations        Ther Activity        STS 10x  15x 15x 10x 10 x   Side stepping in // bars   2 laps 2 laps    Standing   Dance 3x    2 times at one min each   Gait Training        Gait training w/ RW RW 3 x 100ft 100 ft 3x 100 ft 2x 100ft x1 With RW x 400 ft           Modalities        HP/CP prn

## 2023-11-22 ENCOUNTER — OFFICE VISIT (OUTPATIENT)
Dept: PHYSICAL THERAPY | Facility: CLINIC | Age: 53
End: 2023-11-22
Payer: MEDICARE

## 2023-11-22 DIAGNOSIS — M54.10 BACK PAIN WITH RIGHT-SIDED RADICULOPATHY: ICD-10-CM

## 2023-11-22 DIAGNOSIS — R26.9 ABNORMALITY OF GAIT AND MOBILITY: Primary | ICD-10-CM

## 2023-11-22 PROCEDURE — 97116 GAIT TRAINING THERAPY: CPT

## 2023-11-22 PROCEDURE — 97140 MANUAL THERAPY 1/> REGIONS: CPT

## 2023-11-22 PROCEDURE — 97110 THERAPEUTIC EXERCISES: CPT

## 2023-11-22 NOTE — PROGRESS NOTES
Daily Note     Today's date: 2023  Patient name: Viet Bernardo  : 1970  MRN: [id number]  Referring provider: JULIUS Castro  Dx:   Encounter Diagnosis     ICD-10-CM    1. Abnormality of gait and mobility  R26.9       2. Back pain with right-sided radiculopathy  M54.10           Start Time: 1600  Stop Time: 1700  Total time in clinic (min): 60 minutes    Subjective: pt still using a walker for ambulation. Objective: See treatment diary below      Assessment: Tolerated treatment well. Patient would benefit from continued PT  pt did well with his exercises today and was able to walk 4 times at 100 plus feet each time. He was getting tired with the last walk and had a harder time lifting his feet., He needs cues through out to help do his exercises correctly. We will continue to work on his strength and endurance. Plan: Continue per plan of care. Precautions: Down Syndrome 1:1 treatment - assist from therapist for completion of TE, Fall Risk           Manuals  Reassess    B LE - Hamstring, Piriformis, hip flexion 8 min 15 min 15 min 15 min 8 min           Neuro Re-Ed                                         Ther Ex        NuStep for strengthening 5m L5 L5 8 min L6 8 min L6 10 min L 4 5 min   Bike for strengthening    5 min    Bridges 20 x 15x 15x 15x 20 x   LTR 10x       SKTC        LAQ 10x  20x ea. 20x ea. 30 x each   Hip Abd L2 tb 10x  MRE 15x ea. 15x ea.    15 x    Clamshells        Supine SLR 10 x  15x ea  10 x   DKTC w/PBall        Seated Flex Stretch w/PBall 10x 5"       Seated trunk rotations        Ther Activity        STS 10x  15x 15x 10x 10 x   Side stepping in // bars   2 laps 2 laps    Standing   Dance 3x    2 times at one min each   Gait Training        Gait training w/ RW RW 4 x 100ft 100 ft 3x 100 ft 2x 100ft x1 With RW x 400 ft           Modalities        HP/CP prn

## 2023-11-28 ENCOUNTER — OFFICE VISIT (OUTPATIENT)
Dept: PHYSICAL THERAPY | Facility: CLINIC | Age: 53
End: 2023-11-28
Payer: MEDICARE

## 2023-11-28 DIAGNOSIS — M54.10 BACK PAIN WITH RIGHT-SIDED RADICULOPATHY: ICD-10-CM

## 2023-11-28 DIAGNOSIS — R26.9 ABNORMALITY OF GAIT AND MOBILITY: Primary | ICD-10-CM

## 2023-11-28 PROCEDURE — 97530 THERAPEUTIC ACTIVITIES: CPT | Performed by: PHYSICAL THERAPIST

## 2023-11-28 PROCEDURE — 97110 THERAPEUTIC EXERCISES: CPT | Performed by: PHYSICAL THERAPIST

## 2023-11-28 PROCEDURE — 97112 NEUROMUSCULAR REEDUCATION: CPT | Performed by: PHYSICAL THERAPIST

## 2023-11-28 PROCEDURE — 97140 MANUAL THERAPY 1/> REGIONS: CPT | Performed by: PHYSICAL THERAPIST

## 2023-11-28 NOTE — PROGRESS NOTES
Daily Note     Today's date: 2023  Patient name: Esperanza Das  : 1970  MRN: [id number]  Referring provider: JULIUS Khan  Dx:   Encounter Diagnosis     ICD-10-CM    1. Abnormality of gait and mobility  R26.9       2. Back pain with right-sided radiculopathy  M54.10           Start Time: 1300  Stop Time: 1400  Total time in clinic (min): 60 minutes    Subjective: Patient's brother reports that the patient has returned to his program and has been walking around more per their report. He entering therapy today in a WC pushed by his brother. Objective: See treatment diary below      Assessment: Tolerated treatment well. Patient demonstrated fatigue post treatment, exhibited good technique with therapeutic exercises, and would benefit from continued PT Patient with better overall tolerance for therapy interventions today with improved gait pattern noted and improved motivation. Continued to focus on progression of mobility and strength for improved tolerance with functional activities. Plan: Continue per plan of care. Progress treatment as tolerated. Precautions: Down Syndrome 1:1 treatment - assist from therapist for completion of TE, Fall Risk           Manuals  Reassess    B LE - Hamstring, Piriformis, hip flexion 8 min 10 min 15 min 15 min 8 min           Neuro Re-Ed                                         Ther Ex        NuStep for strengthening 5m L5 L5 7 min L6 8 min L6 10 min L 4 5 min   Bike for strengthening    5 min    Bridges 20 x 20x 15x 15x 20 x   LTR 10x       SKTC        LAQ 10x  20x 20x ea. 30 x each   Hip Abd L2 tb 10x   15x ea.    15 x    Clamshells        Supine SLR 10 x  15x ea  10 x   DKTC w/PBall        Seated Flex Stretch w/PBall 10x 5"       Seated trunk rotations        Ther Activity        STS 10x  15x 15x 10x 10 x   Side stepping in // bars   2 laps 2 laps    Ball toss in chair  Big red ball 20x      Standing   Dance 3x 2 times at one min each   Gait Training        Gait training w/ RW RW 4 x 100ft RW 3x200 ft 100 ft 2x 100ft x1 With RW x 400 ft           Modalities        HP/CP prn

## 2023-11-30 ENCOUNTER — OFFICE VISIT (OUTPATIENT)
Dept: PHYSICAL THERAPY | Facility: CLINIC | Age: 53
End: 2023-11-30
Payer: MEDICARE

## 2023-11-30 DIAGNOSIS — M54.10 BACK PAIN WITH RIGHT-SIDED RADICULOPATHY: Primary | ICD-10-CM

## 2023-11-30 DIAGNOSIS — R26.9 ABNORMALITY OF GAIT AND MOBILITY: ICD-10-CM

## 2023-11-30 PROCEDURE — 97110 THERAPEUTIC EXERCISES: CPT

## 2023-11-30 PROCEDURE — 97140 MANUAL THERAPY 1/> REGIONS: CPT

## 2023-11-30 PROCEDURE — 97530 THERAPEUTIC ACTIVITIES: CPT

## 2023-12-04 ENCOUNTER — OFFICE VISIT (OUTPATIENT)
Dept: PHYSICAL THERAPY | Facility: CLINIC | Age: 53
End: 2023-12-04
Payer: MEDICARE

## 2023-12-04 DIAGNOSIS — R26.9 ABNORMALITY OF GAIT AND MOBILITY: Primary | ICD-10-CM

## 2023-12-04 DIAGNOSIS — M54.10 BACK PAIN WITH RIGHT-SIDED RADICULOPATHY: ICD-10-CM

## 2023-12-04 PROCEDURE — 97140 MANUAL THERAPY 1/> REGIONS: CPT

## 2023-12-04 PROCEDURE — 97530 THERAPEUTIC ACTIVITIES: CPT

## 2023-12-04 PROCEDURE — 97110 THERAPEUTIC EXERCISES: CPT

## 2023-12-04 NOTE — PROGRESS NOTES
Daily Note     Today's date: 2023  Patient name: Marianela Rowe  : 1970  MRN: [id number]  Referring provider: JULIUS Fagan  Dx:   Encounter Diagnosis     ICD-10-CM    1. Abnormality of gait and mobility  R26.9       2. Back pain with right-sided radiculopathy  M54.10           Start Time: 1400  Stop Time: 1500  Total time in clinic (min): 60 minutes    Subjective:  he was out today with his school and did some walking. Objective: See treatment diary below      Assessment: Tolerated treatment well. Patient would benefit from continued PT   pt was able to walk 4 x today at 100 plus feet for each. He did better with sit to stand, transfers and over all balance and control when walking. We will continue to work on his gait and strength,. Plan: Continue per plan of care. Precautions: Down Syndrome 1:1 treatment - assist from therapist for completion of TE, Fall Risk           Manuals    B LE - Hamstring, Piriformis, hip flexion 8 min 10 min 15 min 15 min 8 min           Neuro Re-Ed                                         Ther Ex        NuStep for strengthening 5m L5 L5 7 min L6 8 min L6 10 min L 4 5 min   Bike for strengthening    5 min    Bridges 20 x 20x 15x 15x 20 x   LTR 10x       SKTC        LAQ 10x  20x 20x ea. 20 x 30 x each   Hip Abd L2 tb 10x   15x ea.    15 x    Clamshells        Supine SLR 10 x  15x ea 15 x  10 x   DKTC w/PBall        Seated Flex Stretch w/PBall 10x 5"       Seated trunk rotations        Ther Activity        STS 10x  15x 15x 20 x 10 x   Side stepping in // bars   2 laps     Ball toss in chair  Big red ball 20x  Big red 40 x     Standing   Dance 3x    2 times at one min each   Gait Training        Gait training w/ RW RW 4 x 100ft RW 3x200 ft 100 ft 4 x  100ft x4 With RW x 400 ft           Modalities        HP/CP prn

## 2023-12-06 ENCOUNTER — OFFICE VISIT (OUTPATIENT)
Dept: PHYSICAL THERAPY | Facility: CLINIC | Age: 53
End: 2023-12-06
Payer: MEDICARE

## 2023-12-06 DIAGNOSIS — M54.10 BACK PAIN WITH RIGHT-SIDED RADICULOPATHY: Primary | ICD-10-CM

## 2023-12-06 DIAGNOSIS — R26.9 ABNORMALITY OF GAIT AND MOBILITY: ICD-10-CM

## 2023-12-06 PROCEDURE — 97110 THERAPEUTIC EXERCISES: CPT

## 2023-12-06 PROCEDURE — 97116 GAIT TRAINING THERAPY: CPT

## 2023-12-06 PROCEDURE — 97140 MANUAL THERAPY 1/> REGIONS: CPT

## 2023-12-11 ENCOUNTER — OFFICE VISIT (OUTPATIENT)
Dept: PHYSICAL THERAPY | Facility: CLINIC | Age: 53
End: 2023-12-11
Payer: MEDICARE

## 2023-12-11 DIAGNOSIS — M54.10 BACK PAIN WITH RIGHT-SIDED RADICULOPATHY: ICD-10-CM

## 2023-12-11 DIAGNOSIS — R26.9 ABNORMALITY OF GAIT AND MOBILITY: Primary | ICD-10-CM

## 2023-12-11 PROCEDURE — 97110 THERAPEUTIC EXERCISES: CPT

## 2023-12-11 PROCEDURE — 97530 THERAPEUTIC ACTIVITIES: CPT

## 2023-12-11 PROCEDURE — 97116 GAIT TRAINING THERAPY: CPT

## 2023-12-11 NOTE — PROGRESS NOTES
Daily Note     Today's date: 2023  Patient name: Eneida Luke  : 1970  MRN: [id number]  Referring provider: JULIUS Jaramillo  Dx:   Encounter Diagnosis     ICD-10-CM    1. Abnormality of gait and mobility  R26.9       2. Back pain with right-sided radiculopathy  M54.10           Start Time: 1400  Stop Time: 1500  Total time in clinic (min): 60 minutes    Subjective: pt was in good spirits and came into therapy today in the wheel chair. He will get his injection on Dec 22. Objective: See treatment diary below      Assessment: Tolerated treatment well. Patient would benefit from continued PT   pt was able to walk 3 times today at 150 feet each., He had good tolerance with all exercises and is showing improvement with endurance and strength,. Pt was able to use 4 # today for SAQ . We will continue to work on his walking and strength,. We held on his stretching today at his brothers request.,      Plan: Continue per plan of care. Precautions: Down Syndrome 1:1 treatment - assist from therapist for completion of TE, Fall Risk           Manuals    B LE - Hamstring, Piriformis, hip flexion  10 min 15 min 15 min 8 min           Neuro Re-Ed                                         Ther Ex        NuStep for strengthening 5m L5 L5 7 min L6 8 min L6 10 min L 4 10 min   Bike for strengthening    5 min    Bridges 20 x 20x 15x 15x 30 x   LTR 10x       SKTC 10 x       LAQ 40 x  3 # 20x 20x ea. 20 x 30 x each 2#   Hip Abd   15x ea.    15 x    Clamshells        Supine SLR 10 x  15x ea 15 x  15x   DKTC w/PBall        Seated Flex Stretch w/PBall 10x 5"    10 x 5 "   Supine abduction 20 x  OTB        Seated trunk rotations        Ther Activity        STS 10x 15x 15x 20 x 10 x   Side stepping in // bars   2 laps     Ball toss in chair 40 x Big red ball 20x  Big red 40 x     Standing   2 min x two    2 times at one min each   Gait Training        Gait training w/ RW RW 3 x 150 ft RW 3x200 ft 100 ft 4 x  100ft x4 With RW x 400 ft           Modalities        HP/CP prn

## 2023-12-13 ENCOUNTER — OFFICE VISIT (OUTPATIENT)
Dept: PHYSICAL THERAPY | Facility: CLINIC | Age: 53
End: 2023-12-13
Payer: MEDICARE

## 2023-12-13 DIAGNOSIS — M54.10 BACK PAIN WITH RIGHT-SIDED RADICULOPATHY: Primary | ICD-10-CM

## 2023-12-13 DIAGNOSIS — R26.9 ABNORMALITY OF GAIT AND MOBILITY: ICD-10-CM

## 2023-12-13 PROCEDURE — 97530 THERAPEUTIC ACTIVITIES: CPT

## 2023-12-13 PROCEDURE — 97110 THERAPEUTIC EXERCISES: CPT

## 2023-12-13 PROCEDURE — 97140 MANUAL THERAPY 1/> REGIONS: CPT

## 2023-12-13 NOTE — PROGRESS NOTES
Daily Note     Today's date: 2023  Patient name: Luz Maria Schwartz  : 1970  MRN: [id number]  Referring provider: JULIUS Webster  Dx:   Encounter Diagnosis     ICD-10-CM    1. Back pain with right-sided radiculopathy  M54.10       2. Abnormality of gait and mobility  R26.9           Start Time: 1400  Stop Time: 1500  Total time in clinic (min): 60 minutes    Subjective: Pt continues to use the walker for ambulation. Objective: See treatment diary below      Assessment: Tolerated treatment well. Patient would benefit from continued PT  we continue to work on his strength , endurance and gait. Pt is able to walk 3 x in the clinic today for 150 feet each. Pt still using the walker for his walking. Plan: Continue per plan of care. Precautions: Down Syndrome 1:1 treatment - assist from therapist for completion of TE, Fall Risk           Manuals    B LE - Hamstring, Piriformis, hip flexion  10 15 min 15 min 8 min           Neuro Re-Ed                                         Ther Ex        NuStep for strengthening 5m L5 L5 7 min L6 8 min L6 10 min L 4 10 min   Bike for strengthening    5 min    Bridges 20 x 20x 15x 15x 30 x   LTR 10x       SKTC 10 x       LAQ 40 x  3 # 20x  3 #  20x ea. 20 x 30 x each 2#   Hip Abd   15x ea.    15 x    Clamshells        Supine SLR 10 x  15x ea 15 x  15x   DKTC w/PBall        Seated Flex Stretch w/PBall 10x 5"    10 x 5 "   Supine abduction 20 x  OTB        Seated trunk rotations        Ther Activity        STS 10x 15x 15x 20 x 10 x   Side stepping in // bars   2 laps     Ball toss in chair 40 x Big red ball 20x  Big red 40 x     Standing   2 min x two    2 times at one min each   Gait Training        Gait training w/ RW RW 3 x 150 ft RW 3x200 ft 100 ft 4 x  100ft x4 With RW x 400 ft           Modalities        HP/CP prn

## 2023-12-18 ENCOUNTER — APPOINTMENT (OUTPATIENT)
Dept: PHYSICAL THERAPY | Facility: CLINIC | Age: 53
End: 2023-12-18
Payer: MEDICARE

## 2023-12-20 ENCOUNTER — OFFICE VISIT (OUTPATIENT)
Dept: PHYSICAL THERAPY | Facility: CLINIC | Age: 53
End: 2023-12-20
Payer: MEDICARE

## 2023-12-20 DIAGNOSIS — M54.10 BACK PAIN WITH RIGHT-SIDED RADICULOPATHY: ICD-10-CM

## 2023-12-20 DIAGNOSIS — R26.9 ABNORMALITY OF GAIT AND MOBILITY: Primary | ICD-10-CM

## 2023-12-20 PROCEDURE — 97530 THERAPEUTIC ACTIVITIES: CPT

## 2023-12-20 PROCEDURE — 97110 THERAPEUTIC EXERCISES: CPT

## 2023-12-20 PROCEDURE — 97116 GAIT TRAINING THERAPY: CPT

## 2023-12-20 NOTE — PROGRESS NOTES
"Daily Note     Today's date: 2023  Patient name: Alfred Lawson  : 1970  MRN: [id number]  Referring provider: Patria Tristan CRNP  Dx:   Encounter Diagnosis     ICD-10-CM    1. Abnormality of gait and mobility  R26.9       2. Back pain with right-sided radiculopathy  M54.10           Start Time: 1400  Stop Time: 1500  Total time in clinic (min): 60 minutes    Subjective: pt reports no pain today . He still uses a walker for ambulation with one assist.       Objective: See treatment diary below      Assessment: Tolerated treatment well. Patient would benefit from continued PT    PT was able to walk 4 x for 100 ft each. We continue to work on his strength and gait. We worked on strengthening in the chair and mat table.  He is showing improvement with his gait and is able to walk further before a rest.       Plan: Continue per plan of care.      Precautions: Down Syndrome 1:1 treatment - assist from therapist for completion of TE, Fall Risk           Manuals    B LE - Hamstring, Piriformis, hip flexion  10 15 min 15 min 8 min           Neuro Re-Ed                                         Ther Ex        NuStep for strengthening 5m L5 L5 7 min L6 8 min L6 10 min L 4 10 min   Bike for strengthening    5 min    Bridges 20 x 20x 15x 15x 30 x   LTR 10x       SKTC 10 x       LAQ 40 x  3 # 20x  3 #  20x ea.  20 x 30 x each 2#   Hip Abd   15x ea.   15 x    Clamshells        Supine SLR 10 x  15x ea 15 x  15x   DKTC w/PBall        Seated Flex Stretch w/PBall 10x 5\"    10 x 5 \"   Supine abduction 20 x  OTB   20 x OTB     Seated trunk rotations        Ther Activity        STS 10x 15x 15x 20 x 10 x   Side stepping in // bars        Ball toss in chair 40 x Big red ball 20x Red [id number] X Big red 40 x     Standing   2 min x two  15X  2 times at one min each   Gait Training        Gait training w/ RW RW 3 x 150 ft RW 3x200 ft 100 ft 4 x  100ft x4 With RW x 400 ft           Modalities        HP/CP " prn

## 2023-12-27 ENCOUNTER — OFFICE VISIT (OUTPATIENT)
Dept: PHYSICAL THERAPY | Facility: CLINIC | Age: 53
End: 2023-12-27
Payer: MEDICARE

## 2023-12-27 DIAGNOSIS — M54.10 BACK PAIN WITH RIGHT-SIDED RADICULOPATHY: ICD-10-CM

## 2023-12-27 DIAGNOSIS — R26.9 ABNORMALITY OF GAIT AND MOBILITY: Primary | ICD-10-CM

## 2023-12-27 PROCEDURE — 97116 GAIT TRAINING THERAPY: CPT | Performed by: PHYSICAL THERAPIST

## 2023-12-27 PROCEDURE — 97530 THERAPEUTIC ACTIVITIES: CPT | Performed by: PHYSICAL THERAPIST

## 2023-12-27 PROCEDURE — 97110 THERAPEUTIC EXERCISES: CPT | Performed by: PHYSICAL THERAPIST

## 2023-12-27 PROCEDURE — 97140 MANUAL THERAPY 1/> REGIONS: CPT | Performed by: PHYSICAL THERAPIST

## 2023-12-27 NOTE — LETTER
2023    JULIUS Eubanks  [address] [facility] [address]    Patient: Alfred Lawson   YOB: 1970   Date of Visit: 2023     Encounter Diagnosis     ICD-10-CM    1. Abnormality of gait and mobility  R26.9       2. Back pain with right-sided radiculopathy  M54.10           Dear Dr. Tristan:    Thank you for your recent referral of Alfred Lawson. Please review the attached evaluation summary from Alfred's recent visit.     Please verify that you agree with the plan of care by signing the attached order.     If you have any questions or concerns, please do not hesitate to call.     I sincerely appreciate the opportunity to share in the care of one of your patients and hope to have another opportunity to work with you in the near future.       Sincerely,    Fede Rasheed, PT      Referring Provider:      I certify that I have read the below Plan of Care and certify the need for these services furnished under this plan of treatment while under my care.                    JULIUS Eubanks  [address] [facility] [address]  Via Fax: [phone]          PT Re-Evaluation     Today's date: 2023  Patient name: Alfred Lawson  : 1970  MRN: [id number]  Referring provider: Patria Tristan CRNP  Dx:   Encounter Diagnosis     ICD-10-CM    1. Abnormality of gait and mobility  R26.9       2. Back pain with right-sided radiculopathy  M54.10           Start Time: 1400  Stop Time: 1500  Total time in clinic (min): 60 minutes    Assessment  Assessment details: Patient has attended 18 physical therapy visits to date. Patient is demonstrating improved lumbar mobility and improving strength to the Les. There is noted improvement to gait pattern with HH assistance today in place of rolling walker for assistance with ambulation. There continues to be mild trendelenburg lurch to the L LE during stance phase which does become more prominent as the patient fatigues.  Pain levels are much more controlled since the last evaluation.   Impairments: abnormal or restricted ROM, activity intolerance, impaired physical strength, lacks appropriate home exercise program, pain with function, safety issue, poor posture  and poor body mechanics  Other impairment: decreased flexibility    Symptom irritability: lowUnderstanding of Dx/Px/POC: good   Prognosis: good    Goals  STGs:  1.  Initiate and complete HEP with assist from family. - MET  2.  Improve BLE strength by 1/2 grade in 4 weeks. - MET  LTGs:  1.  Patient to complete HEP consistently with family assist in 12 weeks. - Not MET  2.  Improve L/S ROM to WNL t/o in 12 weeks to improve function. - Progressing  3.  Improve B hip ROM to = in seated position in 12 weeks. - Progressing  4.  Patient will be able to transfer I in/out of tub at home in 12 weeks. - MET  5.  Patient will be able to I dress at home in 12 weeks. - MET  6.  Patient will be able to complete self care after toileting at home in 12 weeks. - MET  7.  No TTP note over R piriformis (will be able to palpate without grimace) in 12 weeks.  - MET      Plan  Plan details: Patient will continue to benefit from skilled physical therapy to address the functional deficits that were identified during the evaluation today. We will continue to progress the therapy program to address these functional deficits and achieve the established goals.   Patient informed that from this point forward, to ensure adherence to the aforementioned plan of care, all or some of the treatment may be performed and carried out by a Physical Therapy Assistant (PTA) with supervision from a licensed Physical Therapist (PT) in accordance with [facility] Physical Therapy Practice Act.        Patient would benefit from: skilled physical therapy  Planned modality interventions: cryotherapy and thermotherapy: hydrocollator packs  Planned therapy interventions: abdominal trunk stabilization, IASTM, body  "mechanics training, neuromuscular re-education, self care, postural training, patient education, strengthening, stretching, therapeutic exercise, therapeutic activities, functional ROM exercises, flexibility, home exercise program, manual therapy, nerve gliding, gait training and transfer training  Frequency: 2x week  Duration in weeks: 4  Plan of Care beginning date: 12/27/2023  Plan of Care expiration date: 1/24/2024  Treatment plan discussed with: patient and family        Subjective Evaluation    History of Present Illness  Mechanism of injury: Patient with Down Syndrome - unable to report history - his brother is primary historian.    The patient's brother states that the patient had L sided back pain awhile ago.  He notes that the doctor had got him \"straightened out\" and his back had been feeling better.    The patient's brother states that the patient developed back pain and he had a hard time moving.  He had gone to see the chiropractor for two visits prior to going to the ER.  He notes that he was adjusted twice.  His brother states that on 9/24 the patient had more pain and he was taken to the hospital  ER by ambulance.  Per his brother they did a CT scan and the test was (-).  He was sent home same day with Flexeral and Ibuprofen.  He had a follow up with his PCP on 9/27/23.  He was told to stop the Ibuprofen and use Tylenol.  He was referred to OPPT and he now presents for his evaluation.      He does not have a follow up appointment with his PCP scheduled at this time.       He went to [facility] ER on 9/24/23.  (His brother notes that the notes from the hospital are wrong, he was seen for his R side LBP).    From EMR review of ER visit:  53 y.o. male with a PMH of down-syndrome presenting for left buttock pain s/p chiropractor manipulation. Patient initially was manipulated 2 weeks ago for chronic lower back pain with reported leg length discrepancy and then again last week. Although history is " difficult to obtain for the patient secondary to his history of Down syndrome he is able to communicate in a limited manner and further history was obtained from his brother who he lives with. Patient has been ambulating with an antalgic gait secondary to left buttock discomfort. He points to his left buttock when asked where it hurts. Patient has not had any loss of bladder or bowel. Has not had any fevers.    Per his brother:  The patient has complaints of pain, his brother notes that he sees tears in his eyes.  He did give him a Flexeral last night around 6:00, no medicine since then.  His brother notes that he has been having a hard time getting out of the tub and also unable to pull up his pants and perform self care after using the toilet.  These activities are things that he was able to do prior to having the back pain.      He goes to program during the day, four days a week, six hours a day.  He hasn't been going since he developed lower back pain.      Update 2023:  Patient continues with reports of intermittent low back and buttock pain. His brother is reporting continued limitation with standing and walking activities secondary to patient's reports of increased pain and self restriction. He is schedule for an MRI on Wednesday and will be following up with the spine physician on Thursday.     Update 2023:  Patient is returning to therapy today with reports of overall improvements over the past month. His brother is reporting overall improvements with his adls and notes that he has been using the wheelchair less and walking with his walker more. He also notes that he has been ascending stairs with a reciprocal gait pattern.   Patient Goals  Patient goals for therapy: increased motion, decreased pain and increased strength  Patient goal: His brother wants him to return to PLOF at home.    Pain  Current pain ratin  At best pain ratin  At worst pain ratin  Location: LBP  Quality:  discomfort    Social Support  Steps to enter house: yes  4  Stairs in house: no   Lives in: one-story house  Lives with: Brother     Employment status: not working    Diagnostic Tests  CT scan: abnormal (See above)    FCE comments: From EMR review of CT Scan from 9/24:  Findings: No acute fractures identified. There are multilevel spondylotic   changes. There is mild grade 1 L3-4 anterolisthesis. No significant soft tissue   pathology is identified.   Treatments  Previous treatment: chiropractic        Objective     Palpation   Left   No palpable tenderness to the erector spinae and lumbar paraspinals.     Right   No palpable tenderness to the erector spinae and lumbar paraspinals.   Tenderness of the piriformis.     Active Range of Motion     Lumbar   Flexion:  Restriction level: minimal  Extension:  Restriction level: moderate  Left lateral flexion:  Restriction level: minimal  Right lateral flexion:  Restriction level: minimal  Left rotation:  Restriction level: minimal  Right rotation:  Restriction level: minimal    Additional Active Range of Motion Details  Seated:  Hip flexion limited R > L hip.  Able to complete full knee extension in sitting.      Hip strength grossly 4/5 t/o.      Strength/Myotome Testing     Left Hip   Planes of Motion   Flexion: 4    Right Hip   Planes of Motion   Flexion: 4    Left Knee   Flexion: 4  Extension: 4    Right Knee   Flexion: 4  Extension: 4    Left Ankle/Foot   Dorsiflexion: 4  Plantar flexion: 4  Great toe extension: 4    Right Ankle/Foot   Dorsiflexion: 4  Plantar flexion: 4  Great toe extension: 4    Ambulation   Weight-Bearing Status   Assistive device used: none    Additional Weight-Bearing Status Details  Patient presents to therapy with front wheeled walker. Gait assessment is with hand held assist.     Ambulation: Level Surfaces   Ambulation with assistive device: contact guard assist    Observational Gait   Increased right stance time. Decreased left stance time.  "    Additional Observational Gait Details  Gait is with HH assist. There is noted IR of the B/L Les with L>R. Less lurch is noted during stance phase of the L LE     Functional Assessment        Comments  Sit to Stand transfers: Independent  Supine to from sit: Min A                 Precautions: Down Syndrome 1:1 treatment - assist from therapist for completion of TE, Fall Risk           Manuals 12/11 12/13 12/20 12/27 Reassess   FOTO 54 SC 12/6   B LE - Hamstring, Piriformis, hip flexion  10 15 min 15 min 8 min           Neuro Re-Ed                                         Ther Ex        NuStep for strengthening 5m L5 L5 7 min L6 8 min L6 10 min L 4 10 min   Bridges 20 x 20x 15x  30 x   LAQ 40 x  3 # 20x  3 #  20x ea.  3# 21x ea.  30 x each 2#   Hip Abd   15x ea.   15 x    Clamshells        Supine SLR 10 x  15x ea  15x   Seated Flex Stretch w/PBall 10x 5\"    10 x 5 \"   Supine abduction 20 x  OTB   20 x OTB     Seated adductor squeeze         Seated trunk rotations        Ther Activity        STS 10x 15x 15x  10 x   Side stepping in // bars    4 laps    Ball toss in chair 40 x Big red ball 20x Red baLL40 X     Squats    25x    Marches    20x    Standing   2 min x two  15X  2 times at one min each   Gait Training        Gait training w/ RW RW 3 x 150 ft RW 3x200 ft 100 ft 4 x  HH assist 200 ft x3 With RW x 400 ft           Modalities        HP/CP prn                                        "

## 2023-12-27 NOTE — PROGRESS NOTES
PT Re-Evaluation     Today's date: 2023  Patient name: Alfred Lawson  : 1970  MRN: [id number]  Referring provider: Patria Tristan CRNP  Dx:   Encounter Diagnosis     ICD-10-CM    1. Abnormality of gait and mobility  R26.9       2. Back pain with right-sided radiculopathy  M54.10           Start Time: 1400  Stop Time: 1500  Total time in clinic (min): 60 minutes    Assessment  Assessment details: Patient has attended 18 physical therapy visits to date. Patient is demonstrating improved lumbar mobility and improving strength to the Les. There is noted improvement to gait pattern with HH assistance today in place of rolling walker for assistance with ambulation. There continues to be mild trendelenburg lurch to the L LE during stance phase which does become more prominent as the patient fatigues. Pain levels are much more controlled since the last evaluation.   Impairments: abnormal or restricted ROM, activity intolerance, impaired physical strength, lacks appropriate home exercise program, pain with function, safety issue, poor posture  and poor body mechanics  Other impairment: decreased flexibility    Symptom irritability: lowUnderstanding of Dx/Px/POC: good   Prognosis: good    Goals  STGs:  1.  Initiate and complete HEP with assist from family. - MET  2.  Improve BLE strength by 1/2 grade in 4 weeks. - MET  LTGs:  1.  Patient to complete HEP consistently with family assist in 12 weeks. - Not MET  2.  Improve L/S ROM to WNL t/o in 12 weeks to improve function. - Progressing  3.  Improve B hip ROM to = in seated position in 12 weeks. - Progressing  4.  Patient will be able to transfer I in/out of tub at home in 12 weeks. - MET  5.  Patient will be able to I dress at home in 12 weeks. - MET  6.  Patient will be able to complete self care after toileting at home in 12 weeks. - MET  7.  No TTP note over R piriformis (will be able to palpate without grimace) in 12 weeks.  - MET      Plan  Plan details:  "Patient will continue to benefit from skilled physical therapy to address the functional deficits that were identified during the evaluation today. We will continue to progress the therapy program to address these functional deficits and achieve the established goals.   Patient informed that from this point forward, to ensure adherence to the aforementioned plan of care, all or some of the treatment may be performed and carried out by a Physical Therapy Assistant (PTA) with supervision from a licensed Physical Therapist (PT) in accordance with [facility] Physical Therapy Practice Act.        Patient would benefit from: skilled physical therapy  Planned modality interventions: cryotherapy and thermotherapy: hydrocollator packs  Planned therapy interventions: abdominal trunk stabilization, IASTM, body mechanics training, neuromuscular re-education, self care, postural training, patient education, strengthening, stretching, therapeutic exercise, therapeutic activities, functional ROM exercises, flexibility, home exercise program, manual therapy, nerve gliding, gait training and transfer training  Frequency: 2x week  Duration in weeks: 4  Plan of Care beginning date: 12/27/2023  Plan of Care expiration date: 1/24/2024  Treatment plan discussed with: patient and family        Subjective Evaluation    History of Present Illness  Mechanism of injury: Patient with Down Syndrome - unable to report history - his brother is primary historian.    The patient's brother states that the patient had L sided back pain awhile ago.  He notes that the doctor had got him \"straightened out\" and his back had been feeling better.    The patient's brother states that the patient developed back pain and he had a hard time moving.  He had gone to see the chiropractor for two visits prior to going to the ER.  He notes that he was adjusted twice.  His brother states that on 9/24 the patient had more pain and he was taken to the hospital "  ER by ambulance.  Per his brother they did a CT scan and the test was (-).  He was sent home same day with Flexeral and Ibuprofen.  He had a follow up with his PCP on 9/27/23.  He was told to stop the Ibuprofen and use Tylenol.  He was referred to OPPT and he now presents for his evaluation.      He does not have a follow up appointment with his PCP scheduled at this time.       He went to [facility] ER on 9/24/23.  (His brother notes that the notes from the hospital are wrong, he was seen for his R side LBP).    From EMR review of ER visit:  53 y.o. male with a PMH of down-syndrome presenting for left buttock pain s/p chiropractor manipulation. Patient initially was manipulated 2 weeks ago for chronic lower back pain with reported leg length discrepancy and then again last week. Although history is difficult to obtain for the patient secondary to his history of Down syndrome he is able to communicate in a limited manner and further history was obtained from his brother who he lives with. Patient has been ambulating with an antalgic gait secondary to left buttock discomfort. He points to his left buttock when asked where it hurts. Patient has not had any loss of bladder or bowel. Has not had any fevers.    Per his brother:  The patient has complaints of pain, his brother notes that he sees tears in his eyes.  He did give him a Flexeral last night around 6:00, no medicine since then.  His brother notes that he has been having a hard time getting out of the tub and also unable to pull up his pants and perform self care after using the toilet.  These activities are things that he was able to do prior to having the back pain.      He goes to program during the day, four days a week, six hours a day.  He hasn't been going since he developed lower back pain.      Update 11/13/2023:  Patient continues with reports of intermittent low back and buttock pain. His brother is reporting continued limitation with standing and  walking activities secondary to patient's reports of increased pain and self restriction. He is schedule for an MRI on Wednesday and will be following up with the spine physician on Thursday.     Update 2023:  Patient is returning to therapy today with reports of overall improvements over the past month. His brother is reporting overall improvements with his adls and notes that he has been using the wheelchair less and walking with his walker more. He also notes that he has been ascending stairs with a reciprocal gait pattern.   Patient Goals  Patient goals for therapy: increased motion, decreased pain and increased strength  Patient goal: His brother wants him to return to PLOF at home.    Pain  Current pain ratin  At best pain ratin  At worst pain ratin  Location: LBP  Quality: discomfort    Social Support  Steps to enter house: yes  4  Stairs in house: no   Lives in: one-story house  Lives with: Brother     Employment status: not working    Diagnostic Tests  CT scan: abnormal (See above)    FCE comments: From EMR review of CT Scan from :  Findings: No acute fractures identified. There are multilevel spondylotic   changes. There is mild grade 1 L3-4 anterolisthesis. No significant soft tissue   pathology is identified.   Treatments  Previous treatment: chiropractic        Objective     Palpation   Left   No palpable tenderness to the erector spinae and lumbar paraspinals.     Right   No palpable tenderness to the erector spinae and lumbar paraspinals.   Tenderness of the piriformis.     Active Range of Motion     Lumbar   Flexion:  Restriction level: minimal  Extension:  Restriction level: moderate  Left lateral flexion:  Restriction level: minimal  Right lateral flexion:  Restriction level: minimal  Left rotation:  Restriction level: minimal  Right rotation:  Restriction level: minimal    Additional Active Range of Motion Details  Seated:  Hip flexion limited R > L hip.  Able to complete  "full knee extension in sitting.      Hip strength grossly 4/5 t/o.      Strength/Myotome Testing     Left Hip   Planes of Motion   Flexion: 4    Right Hip   Planes of Motion   Flexion: 4    Left Knee   Flexion: 4  Extension: 4    Right Knee   Flexion: 4  Extension: 4    Left Ankle/Foot   Dorsiflexion: 4  Plantar flexion: 4  Great toe extension: 4    Right Ankle/Foot   Dorsiflexion: 4  Plantar flexion: 4  Great toe extension: 4    Ambulation   Weight-Bearing Status   Assistive device used: none    Additional Weight-Bearing Status Details  Patient presents to therapy with front wheeled walker. Gait assessment is with hand held assist.     Ambulation: Level Surfaces   Ambulation with assistive device: contact guard assist    Observational Gait   Increased right stance time. Decreased left stance time.     Additional Observational Gait Details  Gait is with HH assist. There is noted IR of the B/L Les with L>R. Less lurch is noted during stance phase of the L LE     Functional Assessment        Comments  Sit to Stand transfers: Independent  Supine to from sit: Min A                 Precautions: Down Syndrome 1:1 treatment - assist from therapist for completion of TE, Fall Risk           Manuals 12/11 12/13 12/20 12/27 Reassess   FOTO 54 SC 12/6   B LE - Hamstring, Piriformis, hip flexion  10 15 min 15 min 8 min           Neuro Re-Ed                                         Ther Ex        NuStep for strengthening 5m L5 L5 7 min L6 8 min L6 10 min L 4 10 min   Bridges 20 x 20x 15x  30 x   LAQ 40 x  3 # 20x  3 #  20x ea.  3# 21x ea.  30 x each 2#   Hip Abd   15x ea.   15 x    Clamshells        Supine SLR 10 x  15x ea  15x   Seated Flex Stretch w/PBall 10x 5\"    10 x 5 \"   Supine abduction 20 x  OTB   20 x OTB     Seated adductor squeeze         Seated trunk rotations        Ther Activity        STS 10x 15x 15x  10 x   Side stepping in // bars    4 laps    Ball toss in chair 40 x Big red ball 20x Red baLL40 X     Squats    " 25x    Marches    20x    Standing   2 min x two  15X  2 times at one min each   Gait Training        Gait training w/ RW RW 3 x 150 ft RW 3x200 ft 100 ft 4 x  HH assist 200 ft x3 With RW x 400 ft           Modalities        HP/CP prn

## 2024-01-03 ENCOUNTER — OFFICE VISIT (OUTPATIENT)
Dept: PHYSICAL THERAPY | Facility: CLINIC | Age: 54
End: 2024-01-03
Payer: MEDICARE

## 2024-01-03 DIAGNOSIS — R26.9 ABNORMALITY OF GAIT AND MOBILITY: ICD-10-CM

## 2024-01-03 DIAGNOSIS — M54.10 BACK PAIN WITH RIGHT-SIDED RADICULOPATHY: Primary | ICD-10-CM

## 2024-01-03 PROCEDURE — 97110 THERAPEUTIC EXERCISES: CPT

## 2024-01-03 PROCEDURE — 97140 MANUAL THERAPY 1/> REGIONS: CPT

## 2024-01-03 PROCEDURE — 97530 THERAPEUTIC ACTIVITIES: CPT

## 2024-01-03 NOTE — PROGRESS NOTES
"Daily Note     Today's date: 1/3/2024  Patient name: Alfred Lawson  : 1970  MRN: [id number]  Referring provider: Patria Tristan CRNP  Dx:   Encounter Diagnosis     ICD-10-CM    1. Back pain with right-sided radiculopathy  M54.10       2. Abnormality of gait and mobility  R26.9           Start Time: 1500  Stop Time: 1600  Total time in clinic (min): 60 minutes    Subjective: pt has been doing well around the house . He still holds on to someones hand when walking.,      Objective: See treatment diary below      Assessment: Tolerated treatment well. Patient would benefit from continued PT  pt did well today with walking and exercises. He is showing improvement with his walking and over all strength in his legs. He was able to walk 3 times in the clinic hand in hand for 100 ft or more . We will continue to work on his strength and walking.       Plan: Continue per plan of care.         Precautions: Down Syndrome 1:1 treatment - assist from therapist for completion of TE, Fall Risk           Manuals  Reassess   FOTO 54 SC 1/3/24   B LE - Hamstring, Piriformis, hip flexion  10 15 min 15 min 8 min           Neuro Re-Ed                                         Ther Ex        NuStep for strengthening 5m L5 L5 7 min L6 8 min L6 10 min L 4 10 min   Bridges 20 x 20x 15x  30 x   LAQ 40 x  3 # 20x  3 #  20x ea.  3# 21x ea.  30 x each 2#   Hip Abd   15x ea.   15 x    Clamshells        Supine SLR 10 x  15x ea  15x   Seated Flex Stretch w/PBall 10x 5\"    10 x 5 \"   Supine abduction 20 x  OTB   20 x OTB  OTB  20 x   Seated adductor squeeze      20 x   Seated trunk rotations     LTR  10 x   Ther Activity        STS 10x 15x 15x  10 x   Side stepping in // bars    4 laps    Ball toss in chair 40 x Big red ball 20x Red [id number] X     Squats    25x    Marches    20x    Standing   2 min x two  15X  2 times at one min each   Gait Training        Gait training w/ RW RW 3 x 150 ft RW 3x200 ft 100 ft 4 x  HH " assist 200 ft x3 With RW x 400 ft           Modalities        HP/CP prn

## 2024-01-04 ENCOUNTER — APPOINTMENT (OUTPATIENT)
Dept: PHYSICAL THERAPY | Facility: CLINIC | Age: 54
End: 2024-01-04
Payer: MEDICARE

## 2024-01-05 ENCOUNTER — OFFICE VISIT (OUTPATIENT)
Dept: PHYSICAL THERAPY | Facility: CLINIC | Age: 54
End: 2024-01-05
Payer: MEDICARE

## 2024-01-05 DIAGNOSIS — R26.9 ABNORMALITY OF GAIT AND MOBILITY: Primary | ICD-10-CM

## 2024-01-05 DIAGNOSIS — M54.10 BACK PAIN WITH RIGHT-SIDED RADICULOPATHY: ICD-10-CM

## 2024-01-05 PROCEDURE — 97530 THERAPEUTIC ACTIVITIES: CPT

## 2024-01-05 PROCEDURE — 97140 MANUAL THERAPY 1/> REGIONS: CPT

## 2024-01-05 PROCEDURE — 97110 THERAPEUTIC EXERCISES: CPT

## 2024-01-05 NOTE — PROGRESS NOTES
"Daily Note     Today's date: 2024  Patient name: Alfred Lawson  : 1970  MRN: [id number]  Referring provider: Patria Tristan CRNP  Dx:   Encounter Diagnosis     ICD-10-CM    1. Abnormality of gait and mobility  R26.9       2. Back pain with right-sided radiculopathy  M54.10           Start Time: 1100  Stop Time: 1200  Total time in clinic (min): 60 minutes    Subjective: pt is doing well ., He reports having no pain today.       Objective: See treatment diary below      Assessment: Tolerated treatment well. Patient would benefit from continued PT  we did four walks in the clinic today for over 100 feet each,., Two of the walks were hand hold only , He did well with his exercises today and was able to use 4 # with seated kicks today.  We continue to work on his gait, strength and endurance.       Plan: Continue per plan of care.      Precautions: Down Syndrome 1:1 treatment - assist from therapist for completion of TE, Fall Risk           Manuals  Reassess   FOTO 54 SC 1/3/24   B LE - Hamstring, Piriformis, hip flexion 8min 10 15 min 15 min 8 min           Neuro Re-Ed                                         Ther Ex        NuStep for strengthening 5m L5 L5 7 min L6 8 min L6 10 min L 4 10 min   Bridges 20 x 20x 15x  30 x   LAQ 40 x   4 #  20x  3 #  20x ea.  3# 21x ea.  30 x each 2#   Hip Abd 15x   15x ea.   15 x    Clamshells        Supine SLR 10 x  15x ea  15x   Seated Flex Stretch w/PBall 10x 5\"    10 x 5 \"   Supine abduction 20 x  OTB   20 x OTB  OTB  20 x   Seated adductor squeeze      20 x   Seated trunk rotations     LTR  10 x   Ther Activity        STS 10x 15x 15x  10 x   Side stepping in // bars    4 laps    Ball toss in chair 40 x Big red ball 20x Red baLL40 X     Squats    25x    Marches    20x    Standing   2 min x two  15X  2 times at one min each   Gait Training        Gait training w/ RW RW 3 x 150 ft RW 3x200 ft 100 ft 4 x  HH assist 200 ft x3 With RW x 400 ft         "   Modalities        HP/CP prn

## 2024-01-08 ENCOUNTER — OFFICE VISIT (OUTPATIENT)
Dept: PHYSICAL THERAPY | Facility: CLINIC | Age: 54
End: 2024-01-08
Payer: MEDICARE

## 2024-01-08 DIAGNOSIS — R26.9 ABNORMALITY OF GAIT AND MOBILITY: ICD-10-CM

## 2024-01-08 DIAGNOSIS — M54.10 BACK PAIN WITH RIGHT-SIDED RADICULOPATHY: Primary | ICD-10-CM

## 2024-01-08 PROCEDURE — 97116 GAIT TRAINING THERAPY: CPT

## 2024-01-08 PROCEDURE — 97110 THERAPEUTIC EXERCISES: CPT

## 2024-01-08 PROCEDURE — 97530 THERAPEUTIC ACTIVITIES: CPT

## 2024-01-08 NOTE — PROGRESS NOTES
"Daily Note     Today's date: 2024  Patient name: Alfred Lawson  : 1970  MRN: [id number]  Referring provider: Patria Tristan CRNP  Dx:   Encounter Diagnosis     ICD-10-CM    1. Back pain with right-sided radiculopathy  M54.10       2. Abnormality of gait and mobility  R26.9           Start Time: 1400  Stop Time: 1500  Total time in clinic (min): 60 minutes    Subjective: pt is doing good today . He is still using a walker for ambulation.      Objective: See treatment diary below      Assessment: Tolerated treatment well. Patient would benefit from continued PT  pt was able to walk 4 x at 200 feet each walk today. He walked with just a light hand hold . He did well with sit to stand today and does well when given instruction . We continue to work on his strength and endurance. He did well with his balance today. We went light with his lower extremity stretching today.       Plan: Continue per plan of care.      Precautions: Down Syndrome 1:1 treatment - assist from therapist for completion of TE, Fall Risk           Manuals  Reassess   FOTO 54 SC 1/3/24   B LE - Hamstring, Piriformis, hip flexion 8min 8 min 15 min 15 min 8 min           Neuro Re-Ed                                         Ther Ex        NuStep for strengthening 5m L5  L6 8 min L6 10 min L 4 10 min   Bridges 20 x 20x 15x  30 x   LAQ 40 x   4 #  40x  5# 20x ea.  3# 21x ea.  30 x each 2#   Hip Abd 15x  15x  15x ea.   15 x    Clamshells        Supine SLR 10 x 15x 15x ea  15x   Seated Flex Stretch w/PBall 10x 5\" 10 x 5 \"    10 x 5 \"   Supine abduction 20 x  OTB  20 x  OTB 20 x OTB  OTB  20 x   Seated adductor squeeze      20 x   Seated trunk rotations  10 x    LTR  10 x   Ther Activity        STS 10x 20 x 15x  10 x   Side stepping in // bars    4 laps    Ball toss in chair 40 x Big red ball 20x Red baLL40 X     Squats    25x    Marches    20x    Standing   2 min x two 2 min 15X  2 times at one min each   Gait Training      "   Gait training w/ RW RW 3 x 150 ft Hand held 4x200 ft 100 ft 4 x  HH assist 200 ft x3 With RW x 400 ft           Modalities        HP/CP prn

## 2024-01-11 ENCOUNTER — OFFICE VISIT (OUTPATIENT)
Dept: PHYSICAL THERAPY | Facility: CLINIC | Age: 54
End: 2024-01-11
Payer: MEDICARE

## 2024-01-11 DIAGNOSIS — M54.10 BACK PAIN WITH RIGHT-SIDED RADICULOPATHY: Primary | ICD-10-CM

## 2024-01-11 DIAGNOSIS — R26.9 ABNORMALITY OF GAIT AND MOBILITY: ICD-10-CM

## 2024-01-11 PROCEDURE — 97110 THERAPEUTIC EXERCISES: CPT

## 2024-01-11 PROCEDURE — 97530 THERAPEUTIC ACTIVITIES: CPT

## 2024-01-11 PROCEDURE — 97116 GAIT TRAINING THERAPY: CPT

## 2024-01-11 NOTE — PROGRESS NOTES
"Daily Note     Today's date: 2024  Patient name: Alfred Lawson  : 1970  MRN: [id number]  Referring provider: Patria Tristan CRNP  Dx:   Encounter Diagnosis     ICD-10-CM    1. Back pain with right-sided radiculopathy  M54.10       2. Abnormality of gait and mobility  R26.9           Start Time: 1405  Stop Time: 1500  Total time in clinic (min): 55 minutes    Subjective: pt continues to use a walker for balance .       Objective: See treatment diary below      Assessment: Tolerated treatment well. Patient would benefit from continued PT  pt completed his program today with good tolerance. He was a bit more tired today and was walking slower today and was moving slower with all exercises. We continue to work his strength , balance and endurance. He is able to walk further in the clinic with hand hold.       Plan: Continue per plan of care.      Precautions: Down Syndrome 1:1 treatment - assist from therapist for completion of TE, Fall Risk           Manuals  Reassess   FOTO 54 SC 1/3/24   B LE - Hamstring, Piriformis, hip flexion 8min 8 min 15 min 15 min 8 min           Neuro Re-Ed                                         Ther Ex        NuStep for strengthening 5m L5  L6 8 min L6 10 min L 4 10 min   Bridges 20 x 20x 15x  30 x   LAQ 40 x   4 #  40x  5# 20x ea.  3# 21x ea.  30 x each 2#   Hip Abd 15x  15x  15x ea.   15 x    Clamshells        Supine SLR 10 x 15x 15x ea  15x   Seated Flex Stretch w/PBall 10x 5\" 10 x 5 \"    10 x 5 \"   Supine abduction 20 x  OTB  20 x  OTB 20 x OTB  OTB  20 x   Seated adductor squeeze      20 x   Seated trunk rotations  10 x  10 x  LTR  10 x   Ther Activity        STS 10x 20 x 15x  10 x   Side stepping in // bars    4 laps    Ball toss in chair 40 x Big red ball 20x Red baLL40 X     Squats    25x    Marches   20 x 20x    Standing   2 min x two 2 min 15X  2 times at one min each   Gait Training        Gait training w/ RW RW 3 x 150 ft Hand held 4x200 ft 100 " ft 4 x  HH assist 200 ft x3 With RW x 400 ft           Modalities        HP/CP prn

## 2024-01-18 ENCOUNTER — APPOINTMENT (OUTPATIENT)
Dept: PHYSICAL THERAPY | Facility: CLINIC | Age: 54
End: 2024-01-18
Payer: MEDICARE

## 2024-01-22 ENCOUNTER — OFFICE VISIT (OUTPATIENT)
Dept: PHYSICAL THERAPY | Facility: CLINIC | Age: 54
End: 2024-01-22
Payer: MEDICARE

## 2024-01-22 DIAGNOSIS — R26.9 ABNORMALITY OF GAIT AND MOBILITY: Primary | ICD-10-CM

## 2024-01-22 DIAGNOSIS — M54.10 BACK PAIN WITH RIGHT-SIDED RADICULOPATHY: ICD-10-CM

## 2024-01-22 PROCEDURE — 97110 THERAPEUTIC EXERCISES: CPT

## 2024-01-22 PROCEDURE — 97530 THERAPEUTIC ACTIVITIES: CPT

## 2024-01-22 PROCEDURE — 97116 GAIT TRAINING THERAPY: CPT

## 2024-01-22 NOTE — PROGRESS NOTES
"Daily Note     Today's date: 2024  Patient name: Alfred Lawson  : 1970  MRN: [id number]  Referring provider: Patria Tristan CRNP  Dx:   Encounter Diagnosis     ICD-10-CM    1. Abnormality of gait and mobility  R26.9       2. Back pain with right-sided radiculopathy  M54.10           Start Time: 1400  Stop Time: 1402  Total time in clinic (min): 2 minutes    Subjective: THE pt has been taking a water pill for fluid. He is getting fitted for compression stockings.       Objective: See treatment diary below      Assessment: Tolerated treatment well. Patient would benefit from continued PT   pt was able to walk four times at 100 feet each today with only light hand hold . He is doing better with sit to stand and with sitting balance.  He was able to use 5# today for seated kicks . He had better tolerance for lower extremity stretching today.  He is using the walker less when at home and at school.       Plan: Continue per plan of care.      Precautions: Down Syndrome 1:1 treatment - assist from therapist for completion of TE, Fall Risk           Manuals 1/5 1/8 1/11 1/22   1/3/24   B LE - Hamstring, Piriformis, hip flexion 8min 8 min 15 min 15 min 8 min           Neuro Re-Ed                                         Ther Ex        NuStep for strengthening 5m L5  L6 8 min L6 10 min L 4 10 min   Bridges 20 x 20x 15x  30 x   LAQ 40 x   4 #  40x  5# 20x ea.  3# 21x ea.  30 x each 2#   Hip Abd 15x  15x  15x ea.  15x 15 x    Clamshells        Supine SLR 10 x 15x 15x ea 15x  15x   Seated Flex Stretch w/PBall 10x 5\" 10 x 5 \"    10 x 5 \"   Supine abduction 20 x  OTB  20 x  OTB 20 x OTB 20 x OTB OTB  20 x   Seated adductor squeeze      20 x   Seated trunk rotations  10 x  10 x  LTR  10 x   Ther Activity        STS 10x 20 x 15x  10 x   Side stepping in // bars    4 laps    Ball toss in chair 40 x Big red ball 20x Red baLL40 X     Squats    25x    Marches   20 x 20x    Standing   2 min x two 2 min 15X  2 times at one " min each   Gait Training        Gait training w/ RW RW 3 x 150 ft Hand held 4x200 ft 100 ft 4 x  HH assist 200 ft x3 With RW x 400 ft           Modalities        HP/CP prn

## 2024-01-25 ENCOUNTER — OFFICE VISIT (OUTPATIENT)
Dept: PHYSICAL THERAPY | Facility: CLINIC | Age: 54
End: 2024-01-25
Payer: MEDICARE

## 2024-01-25 DIAGNOSIS — R26.9 ABNORMALITY OF GAIT AND MOBILITY: ICD-10-CM

## 2024-01-25 DIAGNOSIS — M54.10 BACK PAIN WITH RIGHT-SIDED RADICULOPATHY: Primary | ICD-10-CM

## 2024-01-25 PROCEDURE — 97110 THERAPEUTIC EXERCISES: CPT

## 2024-01-25 PROCEDURE — 97116 GAIT TRAINING THERAPY: CPT

## 2024-01-25 PROCEDURE — 97530 THERAPEUTIC ACTIVITIES: CPT

## 2024-01-25 NOTE — PROGRESS NOTES
"Daily Note     Today's date: 2024  Patient name: Alfred Lawson  : 1970  MRN: [id number]  Referring provider: Patria Tristan CRNP  Dx:   Encounter Diagnosis     ICD-10-CM    1. Back pain with right-sided radiculopathy  M54.10       2. Abnormality of gait and mobility  R26.9           Start Time: 1400  Stop Time: 1500  Total time in clinic (min): 60 minutes    Subjective: Pt continues to use a walker for ambulation.       Objective: See treatment diary below      Assessment: Tolerated treatment well. Patient would benefit from continued PT   we continue to work on his strength , balance and endurance. He is walking better and is only using light contact guard with ambulation in the clinic. Pt does use the walker for longer walks . He isnt using his walker at home for shorter walks. He was able to walk 4 times for over 100 feet each . Pt will be done with therapy today and will continue to work on his strength and walking at home with his caretaker.       Plan: D/C to home program     Precautions: Down Syndrome 1:1 treatment - assist from therapist for completion of TE, Fall Risk           Manuals    B LE - Hamstring, Piriformis, hip flexion 8min 8 min 15 min 15 min 8 min           Neuro Re-Ed                                         Ther Ex        NuStep for strengthening 5m L5  L6 8 min L6 10 min L 4 10 min   Bridges 20 x 20x 15x  30 x   LAQ 40 x   4 #  40x  5# 20x ea.  3# 21x ea.  30 x each 2#   Hip Abd 15x  15x  15x ea.  15x 15 x    Clamshells        Supine SLR 10 x 15x 15x ea 15x  15x   Seated Flex Stretch w/PBall 10x 5\" 10 x 5 \"    10 x 5 \"   Supine abduction 20 x  OTB  20 x  OTB 20 x OTB 20 x OTB OTB  20 x   Seated adductor squeeze      20 x   Seated trunk rotations  10 x  10 x  LTR  10 x   Ther Activity        STS 10x 20 x 15x  10 x   Side stepping in // bars    4 laps    Ball toss in chair 40 x Big red ball 20x Red baLL40 X     Squats    25x    Marches   20 x 20x  "   Standing   2 min x two 2 min 15X  2 times at one min each   Gait Training        Gait training w/ RW RW 3 x 150 ft Hand held 4x200 ft 100 ft 4 x  HH assist 200 ft x3 With RW x 400 ft           Modalities        HP/CP prn

## 2024-09-04 ENCOUNTER — EVALUATION (OUTPATIENT)
Dept: PHYSICAL THERAPY | Facility: CLINIC | Age: 54
End: 2024-09-04
Payer: MEDICARE

## 2024-09-04 DIAGNOSIS — M51.36 DDD (DEGENERATIVE DISC DISEASE), LUMBAR: Primary | ICD-10-CM

## 2024-09-04 PROCEDURE — 97162 PT EVAL MOD COMPLEX 30 MIN: CPT | Performed by: PHYSICAL THERAPIST

## 2024-09-04 PROCEDURE — 97110 THERAPEUTIC EXERCISES: CPT | Performed by: PHYSICAL THERAPIST

## 2024-09-04 NOTE — LETTER
2024    JULIUS Eubanks  [address] [facility] [address]    Patient: Alfred Lawson   YOB: 1970   Date of Visit: 2024     Encounter Diagnosis     ICD-10-CM    1. DDD (degenerative disc disease), lumbar  M51.36           Dear Dr. Tristan:    Thank you for your recent referral of Alfred Lawson. Please review the attached evaluation summary from Alfred's recent visit.     Please verify that you agree with the plan of care by signing the attached order.     If you have any questions or concerns, please do not hesitate to call.     I sincerely appreciate the opportunity to share in the care of one of your patients and hope to have another opportunity to work with you in the near future.       Sincerely,    Fede Rasheed, PT      Referring Provider:      I certify that I have read the below Plan of Care and certify the need for these services furnished under this plan of treatment while under my care.                    JULIUS Eubanks  [address] [facility] [address]  Via Fax: [phone]          PT Evaluation     Today's date: 2024  Patient name: Alfred Lawson  : 1970  MRN: [id number]  Referring provider: Patria Tristan CRNP  Dx:   Encounter Diagnosis     ICD-10-CM    1. DDD (degenerative disc disease), lumbar  M51.36           Start Time: 1715  Stop Time: 1800  Total time in clinic (min): 45 minutes    Assessment  Impairments: abnormal gait, abnormal or restricted ROM, activity intolerance, impaired physical strength, lacks appropriate home exercise program, pain with function and poor posture   Symptom irritability: low    Assessment details: Patient is a 55 y/o male accompanied by his brother and sister in law. He is here today for an evaluation for exacerbation of chronic low back pain. Patient localizes the pain to his central low back when asked to identify the area of concern. There is noted in-toeing to B/L  Les with L>R. Patient has  "greatest limitation with extension rom of the lumbar spine during arom assessment. There is mild weakness noted to B/L Les with greater limitation noted to the L LE. Patient has negative neural tension testing upon evaluation today. There was good tolerance for therapy interventions which focused on lumbar mobility. He offered no reports of increase to baseline pain or symptoms throughout the session today.   Understanding of Dx/Px/POC: good     Prognosis: good    Goals  STGs:  \"Patient will be independent with hep by 2-3 visits.   Decrease low back pain by 25% for improved tolerance with adls and home duties by 3-4 weeks.   Improve Lumbar rom to wfl for improved tolerance with adls and home duties by 3-4 weeks. \"    LTGs:  \"Improve FOTO score from 27 to 44 indicating improved tolerance with activities involving the low back by discharge.   Patient will demonstrate rom and strength to the lumbar spine wfl for improved tolerance with adls and home duties by discharge.   Patient will be able to walk for up to 5 minutes unassisted on the treadmill allowing for improved tolerance with adls and home activities by discharge.       Plan  Patient would benefit from: skilled physical therapy  Planned modality interventions: cryotherapy and thermotherapy: hydrocollator packs    Planned therapy interventions: abdominal trunk stabilization, ADL retraining, body mechanics training, flexibility, functional ROM exercises, home exercise program, therapeutic exercise, therapeutic activities, stretching, strengthening, postural training, patient education, joint mobilization and manual therapy    Frequency: 1-2x week  Duration in weeks: 6  Plan of Care beginning date: 9/4/2024  Plan of Care expiration date: 10/16/2024  Treatment plan discussed with: patient  Plan details: Patient informed that from this point forward, to ensure adherence to the aforementioned plan of care, all or some of the treatment may be performed and carried out " by a Physical Therapy Assistant (PTA) with supervision from a licensed Physical Therapist (PT) in accordance with [facility] Physical Therapy Practice Act.  Patient will continue to benefit from skilled physical therapy to address the functional deficits that were identified during the evaluation today. We will continue to progress the therapy program to address these functional deficits and achieve the established goals.                Subjective Evaluation    History of Present Illness  Mechanism of injury: Patient presents to out patient physical therapy with chief c/o low back pain. Patient is here today with his primary caregiver, his brother, who is reporting that the patient has been experiencing some increased symptoms over the past few weeks. He reports that the pain is worse when he is standing or walking. He has not been using his walker at home but his caregiver notes that he uses the walls at home when he is walking around especially at night.           Recurrent probem    Patient Goals  Patient goals for therapy: decreased pain, increased motion, increased strength and independence with ADLs/IADLs  Patient goal: The caregiver and patient wishe to improve the patient's functional mobility and decrease his back pain.  Pain  Location: Lumbar - Unable to objectify pain    Treatments  Previous treatment: physical therapy        Objective     Strength/Myotome Testing     Left Hip   Planes of Motion   Flexion: 4-  Extension: 4-  Abduction: 4-  Adduction: 4-  External rotation: 3+  Internal rotation: 3+    Right Hip   Planes of Motion   Flexion: 4  Extension: 4  Abduction: 4  Adduction: 4  External rotation: 3+  Internal rotation: 3+    Left Knee   Flexion: 4+  Extension: 4+    Right Knee   Flexion: 4+  Extension: 4+    Tests     Lumbar     Left   Negative slump test.     Right   Negative slump test.       Flowsheet Rows      Flowsheet Row Most Recent Value   PT/OT G-Codes    Current Score 27  "  Projected Score 44               Precautions: Hx of Down Syndrome      Date 9/4 IE       FOTO 27 SC       Manuals                                        Neuro Re-Ed                                                                Ther Ex        NuStep for strength and mobility L5 5 min       Seated lumbar flexion w/ PB 5\" 15x       LTR 5\" 15x       Bridges 5\" 15x                                       Ther Activity                        Gait Training                        Modalities                                               "

## 2024-09-04 NOTE — PROGRESS NOTES
"PT Evaluation     Today's date: 2024  Patient name: Alfred Lawson  : 1970  MRN: [id number]  Referring provider: Patria Tristan CRNP  Dx:   Encounter Diagnosis     ICD-10-CM    1. DDD (degenerative disc disease), lumbar  M51.36           Start Time: 1715  Stop Time: 1800  Total time in clinic (min): 45 minutes    Assessment  Impairments: abnormal gait, abnormal or restricted ROM, activity intolerance, impaired physical strength, lacks appropriate home exercise program, pain with function and poor posture   Symptom irritability: low    Assessment details: Patient is a 55 y/o male accompanied by his brother and sister in law. He is here today for an evaluation for exacerbation of chronic low back pain. Patient localizes the pain to his central low back when asked to identify the area of concern. There is noted in-toeing to B/L  Les with L>R. Patient has greatest limitation with extension rom of the lumbar spine during arom assessment. There is mild weakness noted to B/L Les with greater limitation noted to the L LE. Patient has negative neural tension testing upon evaluation today. There was good tolerance for therapy interventions which focused on lumbar mobility. He offered no reports of increase to baseline pain or symptoms throughout the session today.   Understanding of Dx/Px/POC: good     Prognosis: good    Goals  STGs:  \"Patient will be independent with hep by 2-3 visits.   Decrease low back pain by 25% for improved tolerance with adls and home duties by 3-4 weeks.   Improve Lumbar rom to wfl for improved tolerance with adls and home duties by 3-4 weeks. \"    LTGs:  \"Improve FOTO score from 27 to 44 indicating improved tolerance with activities involving the low back by discharge.   Patient will demonstrate rom and strength to the lumbar spine wfl for improved tolerance with adls and home duties by discharge.   Patient will be able to walk for up to 5 minutes unassisted on the treadmill allowing " for improved tolerance with adls and home activities by discharge.       Plan  Patient would benefit from: skilled physical therapy  Planned modality interventions: cryotherapy and thermotherapy: hydrocollator packs    Planned therapy interventions: abdominal trunk stabilization, ADL retraining, body mechanics training, flexibility, functional ROM exercises, home exercise program, therapeutic exercise, therapeutic activities, stretching, strengthening, postural training, patient education, joint mobilization and manual therapy    Frequency: 1-2x week  Duration in weeks: 6  Plan of Care beginning date: 9/4/2024  Plan of Care expiration date: 10/16/2024  Treatment plan discussed with: patient  Plan details: Patient informed that from this point forward, to ensure adherence to the aforementioned plan of care, all or some of the treatment may be performed and carried out by a Physical Therapy Assistant (PTA) with supervision from a licensed Physical Therapist (PT) in accordance with [facility] Physical Therapy Practice Act.  Patient will continue to benefit from skilled physical therapy to address the functional deficits that were identified during the evaluation today. We will continue to progress the therapy program to address these functional deficits and achieve the established goals.                Subjective Evaluation    History of Present Illness  Mechanism of injury: Patient presents to out patient physical therapy with chief c/o low back pain. Patient is here today with his primary caregiver, his brother, who is reporting that the patient has been experiencing some increased symptoms over the past few weeks. He reports that the pain is worse when he is standing or walking. He has not been using his walker at home but his caregiver notes that he uses the walls at home when he is walking around especially at night.           Recurrent probem    Patient Goals  Patient goals for therapy: decreased pain,  "increased motion, increased strength and independence with ADLs/IADLs  Patient goal: The caregiver and patient wishe to improve the patient's functional mobility and decrease his back pain.  Pain  Location: Lumbar - Unable to objectify pain    Treatments  Previous treatment: physical therapy        Objective     Strength/Myotome Testing     Left Hip   Planes of Motion   Flexion: 4-  Extension: 4-  Abduction: 4-  Adduction: 4-  External rotation: 3+  Internal rotation: 3+    Right Hip   Planes of Motion   Flexion: 4  Extension: 4  Abduction: 4  Adduction: 4  External rotation: 3+  Internal rotation: 3+    Left Knee   Flexion: 4+  Extension: 4+    Right Knee   Flexion: 4+  Extension: 4+    Tests     Lumbar     Left   Negative slump test.     Right   Negative slump test.       Flowsheet Rows      Flowsheet Row Most Recent Value   PT/OT G-Codes    Current Score 27   Projected Score 44               Precautions: Hx of Down Syndrome      Date 9/4 IE       FOTO 27 SC       Manuals                                        Neuro Re-Ed                                                                Ther Ex        NuStep for strength and mobility L5 5 min       Seated lumbar flexion w/ PB 5\" 15x       LTR 5\" 15x       Bridges 5\" 15x                                       Ther Activity                        Gait Training                        Modalities                               "

## 2024-09-11 ENCOUNTER — OFFICE VISIT (OUTPATIENT)
Dept: PHYSICAL THERAPY | Facility: CLINIC | Age: 54
End: 2024-09-11
Payer: MEDICARE

## 2024-09-11 DIAGNOSIS — M51.36 DDD (DEGENERATIVE DISC DISEASE), LUMBAR: Primary | ICD-10-CM

## 2024-09-11 PROCEDURE — 97530 THERAPEUTIC ACTIVITIES: CPT

## 2024-09-11 PROCEDURE — 97116 GAIT TRAINING THERAPY: CPT

## 2024-09-11 PROCEDURE — 97110 THERAPEUTIC EXERCISES: CPT

## 2024-09-11 NOTE — PROGRESS NOTES
"Daily Note     Today's date: 2024  Patient name: Alfred Lawson  : 1970  MRN: [id number]  Referring provider: Patria Tristan CRNP  Dx:   Encounter Diagnosis     ICD-10-CM    1. DDD (degenerative disc disease), lumbar  M51.36           Start Time: 0800  Stop Time: 0845  Total time in clinic (min): 45 minutes    Subjective:  the pts brother states that he has had some back pain off and on,.       Objective: See treatment diary below      Assessment: Tolerated treatment well. Patient would benefit from continued PT  pt completed the full program today with some new exercises started as well. He did well with all exercises.  He said once that he had some pain in his low back with seated flexion . He was able to do sti to stand with no help from using his hands. We will continue to work on his strength and flexibility to help decrease his back pain.       Plan: Continue per plan of care.      Precautions: Hx of Down Syndrome      Date  IE       FOTO 27 SC       Manuals        Hip / piriformis  8 min                              Neuro Re-Ed                                                                Ther Ex        NuStep for strength and mobility L5 5 min 6 min L 5      Seated lumbar flexion w/ PB 5\" 15x 5\" 15 x      LTR 5\" 15x 5\" 15 x      Bridges 5\" 15x 5\" 15 x      SAQ  5\" 15 x       Add squeze  3\" 20 x      Supine bent knee abd  Orange 20 x 3\"               Ther Activity        Sit to stand  15 x              Gait Training        Walk in the clinic  100ft   2 x              Modalities                               "

## 2024-09-18 ENCOUNTER — OFFICE VISIT (OUTPATIENT)
Dept: PHYSICAL THERAPY | Facility: CLINIC | Age: 54
End: 2024-09-18
Payer: MEDICARE

## 2024-09-18 DIAGNOSIS — M51.36 DDD (DEGENERATIVE DISC DISEASE), LUMBAR: Primary | ICD-10-CM

## 2024-09-18 PROCEDURE — 97110 THERAPEUTIC EXERCISES: CPT

## 2024-09-18 PROCEDURE — 97530 THERAPEUTIC ACTIVITIES: CPT

## 2024-09-18 PROCEDURE — 97116 GAIT TRAINING THERAPY: CPT

## 2024-09-18 NOTE — PROGRESS NOTES
"Daily Note     Today's date: 2024  Patient name: Alfred Lawson  : 1970  MRN: [id number]  Referring provider: Patria Tristan CRNP  Dx:   Encounter Diagnosis     ICD-10-CM    1. DDD (degenerative disc disease), lumbar  M51.36           Start Time: 0845  Stop Time: 0930  Total time in clinic (min): 45 minutes    Subjective: pt brother states that there was no change since last visit.       Objective: See treatment diary below      Assessment: Tolerated treatment well. Patient would benefit from continued PT   pt completes his full program with some pain noted in his right hip and low back. He had some trouble lifting his left leg onto the mat table and also getting in and out of the car as told to me by his brother.  He would point to his right hip at times which I feel was due to tightness and some pain.  His brother told me he was more tired today and it showed with his walking in the clinic. He had a had time lifting his left foot mostly .       Plan: Continue per plan of care.      Precautions: Hx of Down Syndrome      Date  IE      FOTO 27 SC       Manuals        Hip / piriformis  8 min 8 min                             Neuro Re-Ed                                                                Ther Ex        NuStep for strength and mobility L5 5 min 6 min L 5 7 min L 5     Seated lumbar flexion w/ PB 5\" 15x 5\" 15 x 5\" 15 x     LTR 5\" 15x 5\" 15 x 5\" 15x      Bridges 5\" 15x 5\" 15 x 5\" 15 x     SAQ  5\" 15 x  5\" 15 x     Add squeze  3\" 20 x 3\" 20 x     Supine bent knee abd  [place] 20 x 3\"  Green 20 x 3\"             Ther Activity        Sit to stand  15 x 20 x             Gait Training        Walk in the clinic  100ft   2 x 100ft x 2             Modalities                                 "

## 2024-09-25 ENCOUNTER — OFFICE VISIT (OUTPATIENT)
Dept: PHYSICAL THERAPY | Facility: CLINIC | Age: 54
End: 2024-09-25
Payer: MEDICARE

## 2024-09-25 DIAGNOSIS — M51.36 DDD (DEGENERATIVE DISC DISEASE), LUMBAR: Primary | ICD-10-CM

## 2024-09-25 DIAGNOSIS — M51.369 DDD (DEGENERATIVE DISC DISEASE), LUMBAR: Primary | ICD-10-CM

## 2024-09-25 PROCEDURE — 97140 MANUAL THERAPY 1/> REGIONS: CPT

## 2024-09-25 PROCEDURE — 97110 THERAPEUTIC EXERCISES: CPT

## 2024-09-25 PROCEDURE — 97530 THERAPEUTIC ACTIVITIES: CPT

## 2024-09-25 NOTE — PROGRESS NOTES
"Daily Note     Today's date: 2024  Patient name: Alfred Lawson  : 1970  MRN: [id number]  Referring provider: Patria Tristan CRNP  Dx:   Encounter Diagnosis     ICD-10-CM    1. DDD (degenerative disc disease), lumbar  M51.36           Start Time: 0845  Stop Time: 0940  Total time in clinic (min): 55 minutes    Subjective:  pt's brother states that he feels he is sore in his back today because he was having trouble getting in and out of the car today.       Objective: See treatment diary below      Assessment: Tolerated treatment well. Patient would benefit from continued PT  pt had some difficulty lifting his left foot off the floor when walking today in the clinic and lifting his legs from the floor up onto the mat table to lay down,  we worked on some marching in the bars today to help strengthen .  Pt was able to complete his full program today with verbal and visual cues needed to complete all exercises.       Plan: Continue per plan of care.      Precautions: Hx of Down Syndrome      Date  IE     FOTO 27 SC       Manuals        Hip / piriformis  8 min 8 min 8 min                            Neuro Re-Ed                                                                Ther Ex        NuStep for strength and mobility L5 5 min 6 min L 5 7 min L 5 8 min L 4    Seated lumbar flexion w/ PB 5\" 15x 5\" 15 x 5\" 15 x 5\" 15 x    LTR 5\" 15x 5\" 15 x 5\" 15x  5\" 15x    Bridges 5\" 15x 5\" 15 x 5\" 15 x 5\" 15x    SAQ  5\" 15 x  5\" 15 x 5\" 15x    Add squeze  3\" 20 x 3\" 20 x 3\" 20 x    Supine bent knee abd  [place] 20 x 3\"  Green 20 x 3\" [place] 20 x 3\"     Stand marching    2 x 20     Ther Activity        Sit to stand  15 x 20 x 20 x            Gait Training        Walk in the clinic  100ft   2 x 100ft x 2 100 ft 2 x            Modalities                                   "

## 2024-10-02 ENCOUNTER — OFFICE VISIT (OUTPATIENT)
Dept: PHYSICAL THERAPY | Facility: CLINIC | Age: 54
End: 2024-10-02
Payer: MEDICARE

## 2024-10-02 DIAGNOSIS — M51.362 DEGENERATION OF INTERVERTEBRAL DISC OF LUMBAR REGION WITH DISCOGENIC BACK PAIN AND LOWER EXTREMITY PAIN: Primary | ICD-10-CM

## 2024-10-02 PROCEDURE — 97110 THERAPEUTIC EXERCISES: CPT | Performed by: PHYSICAL THERAPIST

## 2024-10-02 PROCEDURE — 97116 GAIT TRAINING THERAPY: CPT | Performed by: PHYSICAL THERAPIST

## 2024-10-02 NOTE — PROGRESS NOTES
"Daily Note     Today's date: 10/2/2024  Patient name: Alfred Lawson  : 1970  MRN: [id number]  Referring provider: Patria Tristan CRNP  Dx:   Encounter Diagnosis     ICD-10-CM    1. Degeneration of intervertebral disc of lumbar region with discogenic back pain and lower extremity pain  M51.362           Start Time: 927  Stop Time: 101  Total time in clinic (min): 48 minutes    Subjective: Patient reports increased pain following the last therapy session. His brother reports that the patient noted increased pain while sitting and also walking. He also notes that the patient has difficulties with picking his feet up and tends to shuffle them while walking around at home.       Objective: See treatment diary below      Assessment: Tolerated treatment well. Patient demonstrated fatigue post treatment, exhibited good technique with therapeutic exercises, and would benefit from continued PT Cuing provided to increased ankle DF and hip flexion during ambulation to decreased shuffling of gait pattern. There was good tolerance for therapy interventions today which focused on LE strengthening.       Plan: Continue per plan of care.  Progress treatment as tolerated.       Precautions: Hx of Down Syndrome      Date  IE 9/11 9/18 9/25 10/2   FOTO 27 SC       Manuals        Hip / piriformis  8 min 8 min 8 min                            Neuro Re-Ed                                                                Ther Ex        NuStep for strength and mobility L5 5 min 6 min L 5 7 min L 5 8 min L 4 L4 8 min   Seated lumbar flexion w/ PB 5\" 15x 5\" 15 x 5\" 15 x 5\" 15 x 5\" 15x   LTR 5\" 15x 5\" 15 x 5\" 15x  5\" 15x 5\" 15x   Bridges 5\" 15x 5\" 15 x 5\" 15 x 5\" 15x    SAQ  5\" 15 x  5\" 15 x 5\" 15x 2# 20x   Add squeze  3\" 20 x 3\" 20 x 3\" 20 x 5\" 20x   Supine bent knee abd  [place] 20 x 3\"  Green 20 x 3\" Green 20 x 3\"     Stand marching    2 x 20     Ther Activity        Sit to stand  15 x 20 x 20 x 20x           Gait Training      "   Walk in the clinic cuing for increasing step length and ankle dorsiflexion  100ft   2 x 100ft x 2 100 ft 2 x 100 ft x2           Modalities

## 2024-10-09 ENCOUNTER — EVALUATION (OUTPATIENT)
Dept: PHYSICAL THERAPY | Facility: CLINIC | Age: 54
End: 2024-10-09
Payer: MEDICARE

## 2024-10-09 DIAGNOSIS — M51.360 DEGENERATION OF INTERVERTEBRAL DISC OF LUMBAR REGION WITH DISCOGENIC BACK PAIN: Primary | ICD-10-CM

## 2024-10-09 DIAGNOSIS — M51.362 DEGENERATION OF INTERVERTEBRAL DISC OF LUMBAR REGION WITH DISCOGENIC BACK PAIN AND LOWER EXTREMITY PAIN: ICD-10-CM

## 2024-10-09 PROCEDURE — 97116 GAIT TRAINING THERAPY: CPT

## 2024-10-09 PROCEDURE — 97530 THERAPEUTIC ACTIVITIES: CPT

## 2024-10-09 PROCEDURE — 97110 THERAPEUTIC EXERCISES: CPT

## 2024-10-09 NOTE — PROGRESS NOTES
"Daily Note     Today's date: 10/9/2024  Patient name: Alfred Lawson  : 1970  MRN: [id number]  Referring provider: Patria Tristan CRNP  Dx:   Encounter Diagnosis     ICD-10-CM    1. Degeneration of intervertebral disc of lumbar region with discogenic back pain  M51.360       2. Degeneration of intervertebral disc of lumbar region with discogenic back pain and lower extremity pain  M51.362           Start Time: 09  Stop Time: 1030  Total time in clinic (min): 60 minutes    Subjective:  he is feeling pretty good today.       Objective: See treatment diary below      Assessment: Tolerated treatment well. Patient would benefit from continued PT   pt was able to walk around the clinic 3 x today with hand hold. He did well with sit to stand  20 x. His brother reports that he still has trouble lifting his left leg into the car and when he is getting out.  We continue to work on his strength and endurance . We held on the passive hip stretching today because his brother states that it made him sore a few visits ago.       Plan: Continue per plan of care.      Precautions: Hx of Down Syndrome      Date 10/9 9/11 9/18 9/25 10/2   FOTO        Manuals        Hip / piriformis  8 min 8 min 8 min                            Neuro Re-Ed                                                                Ther Ex        NuStep for strength and mobility L5 7 min 6 min L 5 7 min L 5 8 min L 4 L4 8 min   Seated lumbar flexion w/ PB 5\" 15x 5\" 15 x 5\" 15 x 5\" 15 x 5\" 15x   LTR 5\" 10x 5\" 15 x 5\" 15x  5\" 15x 5\" 15x   Bridges 5\" 15x 5\" 15 x 5\" 15 x 5\" 15x    SAQ 2#  20 x 5\" 15 x  5\" 15 x 5\" 15x 2# 20x   Add squeze 5\" 20 x 3\" 20 x 3\" 20 x 3\" 20 x 5\" 20x   Supine bent knee abd [place] 20 x [place] 20 x 3\"  Green 20 x 3\" Green 20 x 3\"     Stand marching 20x   2 x 20     Ther Activity        Sit to stand 20 x 15 x 20 x 20 x 20x           Gait Training        Walk in the clinic cuing for increasing step length and ankle dorsiflexion 100ft  3 x "  100ft   2 x 100ft x 2 100 ft 2 x 100 ft x2           Modalities

## 2024-10-09 NOTE — PROGRESS NOTES
"PT Re-Evaluation     Today's date: 10/9/2024  Patient name: Alfred Lawson  : 1970  MRN: [id number]  Referring provider: Patria Tristan CRNP  Dx:   Encounter Diagnosis     ICD-10-CM    1. Degeneration of intervertebral disc of lumbar region with discogenic back pain  M51.360       2. Degeneration of intervertebral disc of lumbar region with discogenic back pain and lower extremity pain  M51.362                      Assessment  Impairments: abnormal gait, abnormal or restricted ROM, activity intolerance, impaired physical strength, lacks appropriate home exercise program, pain with function and poor posture   Symptom irritability: low    Assessment details: Patient has attended 6 physical therapy visits to date. He is showing improvements with LE strength and tolerance for ambulation throughout the clinic. He continues with a forward flexed posture with ambulation with decreased step length noted. He is able to ambulate independently and negotiate turns without any LOB throughout the clinic. He continues to respond positively to the therapy interventions that are applied with focus on improving LE strength and balance.   Understanding of Dx/Px/POC: good     Prognosis: good    Goals  STGs:  \"Patient will be independent with hep by 2-3 visits. - MET  Decrease low back pain by 25% for improved tolerance with adls and home duties by 3-4 weeks. - Progressing per patient's brother over the past week.   Improve Lumbar rom to wfl for improved tolerance with adls and home duties by 3-4 weeks. \" - Progressing    LTGs:  \"Improve FOTO score from 27 to 44 indicating improved tolerance with activities involving the low back by discharge. - MET  Patient will demonstrate rom and strength to the lumbar spine wfl for improved tolerance with adls and home duties by discharge. - Progressing  Patient will be able to walk for up to 5 minutes unassisted on the treadmill allowing for improved tolerance with adls and home " activities by discharge. - Progressing      Plan  Patient would benefit from: skilled physical therapy  Planned modality interventions: cryotherapy and thermotherapy: hydrocollator packs    Planned therapy interventions: abdominal trunk stabilization, ADL retraining, body mechanics training, flexibility, functional ROM exercises, home exercise program, therapeutic exercise, therapeutic activities, stretching, strengthening, postural training, patient education, joint mobilization and manual therapy    Frequency: 1-2x week  Duration in weeks: 5  Plan of Care beginning date: 10/9/2024  Plan of Care expiration date: 11/13/2024  Treatment plan discussed with: patient  Plan details: Patient informed that from this point forward, to ensure adherence to the aforementioned plan of care, all or some of the treatment may be performed and carried out by a Physical Therapy Assistant (PTA) with supervision from a licensed Physical Therapist (PT) in accordance with [facility] Physical Therapy Practice Act.  Patient will continue to benefit from skilled physical therapy to address the functional deficits that were identified during the evaluation today. We will continue to progress the therapy program to address these functional deficits and achieve the established goals.                Subjective Evaluation    History of Present Illness  Mechanism of injury: Patient presents to out patient physical therapy with chief c/o low back pain. Patient is here today with his primary caregiver, his brother, who is reporting that the patient has been experiencing some increased symptoms over the past few weeks. He reports that the pain is worse when he is standing or walking. He has not been using his walker at home but his caregiver notes that he uses the walls at home when he is walking around especially at night.     Update 10/9/2024:  Patient's brother reports that the patient is improving and that he has not expressed any  reports of pain at home over the past week. He also notes that the patient continues to utilize the walls at home for assistance when walking throughout the home.           Recurrent probem    Patient Goals  Patient goals for therapy: decreased pain, increased motion, increased strength and independence with ADLs/IADLs  Patient goal: The caregiver and patient wishe to improve the patient's functional mobility and decrease his back pain.  Pain  Location: Lumbar - Unable to objectify pain    Treatments  Previous treatment: physical therapy        Objective     Strength/Myotome Testing     Left Hip   Planes of Motion   Flexion: 4  Extension: 4  Abduction: 4  Adduction: 4  External rotation: 4  Internal rotation: 4    Right Hip   Planes of Motion   Flexion: 4  Extension: 4  Abduction: 4  Adduction: 4  External rotation: 4  Internal rotation: 4    Left Knee   Flexion: 4+  Extension: 4+    Right Knee   Flexion: 4+  Extension: 4+    Tests     Lumbar     Left   Negative slump test.     Right   Negative slump test.     Ambulation     Observational Gait   Gait: crouched   Decreased walking speed and stride length.   Left arm swing: decreased  Right arm swing: decreased  Base of support: increased    Additional Observational Gait Details  B/L IR noted to Les during ambulation with decreased step length and also decreased hip flexion .                Precautions: Hx of Down Syndrome

## 2024-10-09 NOTE — LETTER
2024    JULIUS Eubanks  [address] [facility] [address]    Patient: Alfred Lawson   YOB: 1970   Date of Visit: 10/9/2024     Encounter Diagnosis     ICD-10-CM    1. Degeneration of intervertebral disc of lumbar region with discogenic back pain  M51.360       2. Degeneration of intervertebral disc of lumbar region with discogenic back pain and lower extremity pain  M51.362           Dear Dr. Tristan:    Thank you for your recent referral of Alfred Lawson. Please review the attached evaluation summary from Alfred's recent visit.     Please verify that you agree with the plan of care by signing the attached order.     If you have any questions or concerns, please do not hesitate to call.     I sincerely appreciate the opportunity to share in the care of one of your patients and hope to have another opportunity to work with you in the near future.       Sincerely,    Fede Rasheed, PT      Referring Provider:      I certify that I have read the below Plan of Care and certify the need for these services furnished under this plan of treatment while under my care.                    JULIUS Eubanks  [address] [facility] [address]  Via Fax: [phone]          Daily Note     Today's date: 10/9/2024  Patient name: Alfred Lawson  : 1970  MRN: [id number]  Referring provider: Patria Tristan CRNP  Dx:   Encounter Diagnosis     ICD-10-CM    1. Degeneration of intervertebral disc of lumbar region with discogenic back pain  M51.360       2. Degeneration of intervertebral disc of lumbar region with discogenic back pain and lower extremity pain  M51.362           Start Time: 0930  Stop Time: 1030  Total time in clinic (min): 60 minutes    Subjective:  he is feeling pretty good today.       Objective: See treatment diary below      Assessment: Tolerated treatment well. Patient would benefit from continued PT   pt was able to walk around the clinic 3 x today with  "hand hold. He did well with sit to stand  20 x. His brother reports that he still has trouble lifting his left leg into the car and when he is getting out.  We continue to work on his strength and endurance . We held on the passive hip stretching today because his brother states that it made him sore a few visits ago.       Plan: Continue per plan of care.      Precautions: Hx of Down Syndrome      Date 10/9 9/11 9/18 9/25 10/2   FOTO        Manuals        Hip / piriformis  8 min 8 min 8 min                            Neuro Re-Ed                                                                Ther Ex        NuStep for strength and mobility L5 7 min 6 min L 5 7 min L 5 8 min L 4 L4 8 min   Seated lumbar flexion w/ PB 5\" 15x 5\" 15 x 5\" 15 x 5\" 15 x 5\" 15x   LTR 5\" 10x 5\" 15 x 5\" 15x  5\" 15x 5\" 15x   Bridges 5\" 15x 5\" 15 x 5\" 15 x 5\" 15x    SAQ 2#  20 x 5\" 15 x  5\" 15 x 5\" 15x 2# 20x   Add squeze 5\" 20 x 3\" 20 x 3\" 20 x 3\" 20 x 5\" 20x   Supine bent knee abd [place] 20 x [place] 20 x 3\"  Green 20 x 3\" [place] 20 x 3\"     Stand marching 20x   2 x 20     Ther Activity        Sit to stand 20 x 15 x 20 x 20 x 20x           Gait Training        Walk in the clinic cuing for increasing step length and ankle dorsiflexion 100ft  3 x  100ft   2 x 100ft x 2 100 ft 2 x 100 ft x2           Modalities                                         PT Re-Evaluation     Today's date: 10/9/2024  Patient name: Alfred Lawson  : 1970  MRN: [id number]  Referring provider: Patria Tristan CRNP  Dx:   Encounter Diagnosis     ICD-10-CM    1. Degeneration of intervertebral disc of lumbar region with discogenic back pain  M51.360       2. Degeneration of intervertebral disc of lumbar region with discogenic back pain and lower extremity pain  M51.362                      Assessment  Impairments: abnormal gait, abnormal or restricted ROM, activity intolerance, impaired physical strength, lacks appropriate home exercise program, pain with function and " "poor posture   Symptom irritability: low    Assessment details: Patient has attended 6 physical therapy visits to date. He is showing improvements with LE strength and tolerance for ambulation throughout the clinic. He continues with a forward flexed posture with ambulation with decreased step length noted. He is able to ambulate independently and negotiate turns without any LOB throughout the clinic. He continues to respond positively to the therapy interventions that are applied with focus on improving LE strength and balance.   Understanding of Dx/Px/POC: good     Prognosis: good    Goals  STGs:  \"Patient will be independent with hep by 2-3 visits. - MET  Decrease low back pain by 25% for improved tolerance with adls and home duties by 3-4 weeks. - Progressing per patient's brother over the past week.   Improve Lumbar rom to wfl for improved tolerance with adls and home duties by 3-4 weeks. \" - Progressing    LTGs:  \"Improve FOTO score from 27 to 44 indicating improved tolerance with activities involving the low back by discharge. - MET  Patient will demonstrate rom and strength to the lumbar spine wfl for improved tolerance with adls and home duties by discharge. - Progressing  Patient will be able to walk for up to 5 minutes unassisted on the treadmill allowing for improved tolerance with adls and home activities by discharge. - Progressing      Plan  Patient would benefit from: skilled physical therapy  Planned modality interventions: cryotherapy and thermotherapy: hydrocollator packs    Planned therapy interventions: abdominal trunk stabilization, ADL retraining, body mechanics training, flexibility, functional ROM exercises, home exercise program, therapeutic exercise, therapeutic activities, stretching, strengthening, postural training, patient education, joint mobilization and manual therapy    Frequency: 1-2x week  Duration in weeks: 5  Plan of Care beginning date: 10/9/2024  Plan of Care expiration date: " 11/13/2024  Treatment plan discussed with: patient  Plan details: Patient informed that from this point forward, to ensure adherence to the aforementioned plan of care, all or some of the treatment may be performed and carried out by a Physical Therapy Assistant (PTA) with supervision from a licensed Physical Therapist (PT) in accordance with [facility] Physical Therapy Practice Act.  Patient will continue to benefit from skilled physical therapy to address the functional deficits that were identified during the evaluation today. We will continue to progress the therapy program to address these functional deficits and achieve the established goals.                Subjective Evaluation    History of Present Illness  Mechanism of injury: Patient presents to out patient physical therapy with chief c/o low back pain. Patient is here today with his primary caregiver, his brother, who is reporting that the patient has been experiencing some increased symptoms over the past few weeks. He reports that the pain is worse when he is standing or walking. He has not been using his walker at home but his caregiver notes that he uses the walls at home when he is walking around especially at night.     Update 10/9/2024:  Patient's brother reports that the patient is improving and that he has not expressed any reports of pain at home over the past week. He also notes that the patient continues to utilize the walls at home for assistance when walking throughout the home.           Recurrent probem    Patient Goals  Patient goals for therapy: decreased pain, increased motion, increased strength and independence with ADLs/IADLs  Patient goal: The caregiver and patient wishe to improve the patient's functional mobility and decrease his back pain.  Pain  Location: Lumbar - Unable to objectify pain    Treatments  Previous treatment: physical therapy        Objective     Strength/Myotome Testing     Left Hip   Planes of Motion    Flexion: 4  Extension: 4  Abduction: 4  Adduction: 4  External rotation: 4  Internal rotation: 4    Right Hip   Planes of Motion   Flexion: 4  Extension: 4  Abduction: 4  Adduction: 4  External rotation: 4  Internal rotation: 4    Left Knee   Flexion: 4+  Extension: 4+    Right Knee   Flexion: 4+  Extension: 4+    Tests     Lumbar     Left   Negative slump test.     Right   Negative slump test.     Ambulation     Observational Gait   Gait: crouched   Decreased walking speed and stride length.   Left arm swing: decreased  Right arm swing: decreased  Base of support: increased    Additional Observational Gait Details  B/L IR noted to Les during ambulation with decreased step length and also decreased hip flexion .                Precautions: Hx of Down Syndrome

## 2024-10-16 ENCOUNTER — OFFICE VISIT (OUTPATIENT)
Dept: PHYSICAL THERAPY | Facility: CLINIC | Age: 54
End: 2024-10-16
Payer: MEDICARE

## 2024-10-16 DIAGNOSIS — M51.362 DEGENERATION OF INTERVERTEBRAL DISC OF LUMBAR REGION WITH DISCOGENIC BACK PAIN AND LOWER EXTREMITY PAIN: ICD-10-CM

## 2024-10-16 DIAGNOSIS — M51.360 DEGENERATION OF INTERVERTEBRAL DISC OF LUMBAR REGION WITH DISCOGENIC BACK PAIN: Primary | ICD-10-CM

## 2024-10-16 PROCEDURE — 97530 THERAPEUTIC ACTIVITIES: CPT | Performed by: PHYSICAL THERAPIST

## 2024-10-16 PROCEDURE — 97116 GAIT TRAINING THERAPY: CPT | Performed by: PHYSICAL THERAPIST

## 2024-10-16 PROCEDURE — 97110 THERAPEUTIC EXERCISES: CPT | Performed by: PHYSICAL THERAPIST

## 2024-10-16 NOTE — PROGRESS NOTES
"Daily Note     Today's date: 10/16/2024  Patient name: Alfred Lawson  : 1970  MRN: [id number]  Referring provider: Patria Tristan CRNP  Dx:   Encounter Diagnosis     ICD-10-CM    1. Degeneration of intervertebral disc of lumbar region with discogenic back pain  M51.360       2. Degeneration of intervertebral disc of lumbar region with discogenic back pain and lower extremity pain  M51.362           Start Time: 0800  Stop Time: 0845  Total time in clinic (min): 45 minutes    Subjective: Patient's brother reports that he had an appointment with Neuro the other day and is going to be trialed on a new medication. He also notes that when at home the patient utilizes his arms on the walls to help with his walking but when out of the house he walks with less assistance from the arms.         Objective: See treatment diary below      Assessment: Tolerated treatment well. Patient demonstrated fatigue post treatment, exhibited good technique with therapeutic exercises, and would benefit from continued PT Patient continues to tolerate therapy interventions well. He is challenged with progression of strengthening interventions which do require cuing to improve active mobility through the range. Cuing provided to increased step length during ambulation today.       Plan: Continue per plan of care.  Progress treatment as tolerated.       Precautions: Hx of Down Syndrome      Date 10/9 Reassess 10/16 9/18 9/25 10/2   FOTO        Manuals        Hip / piriformis   8 min 8 min                            Neuro Re-Ed                                                                Ther Ex        NuStep for strength and mobility L5 7 min L5 7 min 7 min L 5 8 min L 4 L4 8 min   Seated lumbar flexion w/ PB 5\" 15x 5\" 20x 5\" 15 x 5\" 15 x 5\" 15x   LTR 5\" 10x  5\" 15x  5\" 15x 5\" 15x   Bridges 5\" 15x  5\" 15 x 5\" 15x    LAQ 2#  20 x 3# 20x  5\" 15 x 5\" 15x 2# 20x   Add squeze 5\" 20 x 5\" 20x 3\" 20 x 3\" 20 x 5\" 20x   Supine bent knee abd " "Green 20 x  Green 20 x 3\" Green 20 x 3\"     Stand marching 20x   2 x 20     Ther Activity        Sit to stand 20 x 20x 20 x 20 x 20x           Gait Training        Walk in the clinic cuing for increasing step length and ankle dorsiflexion 100ft  3 x  150ft x2 100ft x 2 100 ft 2 x 100 ft x2           Modalities                               "

## 2024-10-23 ENCOUNTER — OFFICE VISIT (OUTPATIENT)
Dept: PHYSICAL THERAPY | Facility: CLINIC | Age: 54
End: 2024-10-23
Payer: MEDICARE

## 2024-10-23 DIAGNOSIS — M51.360 DEGENERATION OF INTERVERTEBRAL DISC OF LUMBAR REGION WITH DISCOGENIC BACK PAIN: Primary | ICD-10-CM

## 2024-10-23 DIAGNOSIS — M51.362 DEGENERATION OF INTERVERTEBRAL DISC OF LUMBAR REGION WITH DISCOGENIC BACK PAIN AND LOWER EXTREMITY PAIN: ICD-10-CM

## 2024-10-23 PROCEDURE — 97530 THERAPEUTIC ACTIVITIES: CPT

## 2024-10-23 PROCEDURE — 97110 THERAPEUTIC EXERCISES: CPT

## 2024-10-23 PROCEDURE — 97116 GAIT TRAINING THERAPY: CPT

## 2024-10-23 NOTE — PROGRESS NOTES
"Daily Note     Today's date: 10/23/2024  Patient name: Alfred Lawson  : 1970  MRN: [id number]  Referring provider: Patria Tristan CRNP  Dx:   Encounter Diagnosis     ICD-10-CM    1. Degeneration of intervertebral disc of lumbar region with discogenic back pain  M51.360       2. Degeneration of intervertebral disc of lumbar region with discogenic back pain and lower extremity pain  M51.362           Start Time: 08  Stop Time: 930  Total time in clinic (min): 45 minutes    Subjective:   He has been doing pretty good.       Objective: See treatment diary below      Assessment: Tolerated treatment well. Patient would benefit from continued PT   we tried walking with the cane today in his right hand., He was able to walk twice for about 100  feet . He was slower when using the cane today,.  He did walk better and faster when doing hand hold. He completed all exercises today with good tolerance. He was able to use 3#  today for LAQ and was able to 30 reps.       Plan: Continue per plan of care.      Precautions: Hx of Down Syndrome      Date 10/9 Reassess 10/16 10/23 9/25 10/2   FOTO        Manuals        Hip / piriformis    8 min                            Neuro Re-Ed                                                                Ther Ex        NuStep for strength and mobility L5 7 min L5 7 min 7 min L 5 8 min L 4 L4 8 min   Seated lumbar flexion w/ PB 5\" 15x 5\" 20x 5\" 15 x 5\" 15 x 5\" 15x   LTR 5\" 10x  5\" 15x  5\" 15x 5\" 15x   Bridges 5\" 15x  5\" 15 x 5\" 15x    LAQ 2#  20 x 3# 20x  5\" 15 x 5\" 15x 2# 20x   Add squeze 5\" 20 x 5\" 20x 3\" 20 x 3\" 20 x 5\" 20x   Supine bent knee abd Green 20 x  Green 20 x 3\" Green 20 x 3\"     Stand marching 20x   2 x 20     Ther Activity        Sit to stand 20 x 20x 20 x 20 x 20x           Gait Training        Walk in the clinic cuing for increasing step length and ankle dorsiflexion 100ft  3 x  150ft x2 100ft x 3 100 ft 2 x 100 ft x2           Modalities                      "

## 2024-11-06 ENCOUNTER — OFFICE VISIT (OUTPATIENT)
Dept: PHYSICAL THERAPY | Facility: CLINIC | Age: 54
End: 2024-11-06
Payer: MEDICARE

## 2024-11-06 DIAGNOSIS — M51.362 DEGENERATION OF INTERVERTEBRAL DISC OF LUMBAR REGION WITH DISCOGENIC BACK PAIN AND LOWER EXTREMITY PAIN: Primary | ICD-10-CM

## 2024-11-06 DIAGNOSIS — M51.360 DEGENERATION OF INTERVERTEBRAL DISC OF LUMBAR REGION WITH DISCOGENIC BACK PAIN: ICD-10-CM

## 2024-11-06 PROCEDURE — 97116 GAIT TRAINING THERAPY: CPT

## 2024-11-06 PROCEDURE — 97110 THERAPEUTIC EXERCISES: CPT

## 2024-11-06 NOTE — PROGRESS NOTES
"Daily Note     Today's date: 2024  Patient name: Alfred Lawson  : 1970  MRN: [id number]  Referring provider: Patria Tristan CRNP  Dx:   Encounter Diagnosis     ICD-10-CM    1. Degeneration of intervertebral disc of lumbar region with discogenic back pain and lower extremity pain  M51.362       2. Degeneration of intervertebral disc of lumbar region with discogenic back pain  M51.360           Start Time: 845  Stop Time: 930  Total time in clinic (min): 45 minutes    Subjective:  he began taking his parkinsins medication on Monday, so far he has been doing ok.       Objective: See treatment diary below      Assessment: Tolerated treatment well. Patient would benefit from continued PT  pt was able to complete all exercises today with good tolerance., he was able to walk around the clinic today 3 times at 100 feet each.  He was lifting his legs better today with ambulation and did better with sit to stand.        Plan: Continue per plan of care.      Precautions: Hx of Down Syndrome      Date 10/9 Reassess 10/16 10/23 11/6 10/2   FOTO        Manuals        Hip / piriformis    8 min                            Neuro Re-Ed                                                                Ther Ex        NuStep for strength and mobility L5 7 min L5 7 min 7 min L 5 8 min L 4 L4 8 min   Seated lumbar flexion w/ PB 5\" 15x 5\" 20x 5\" 15 x 5\" 15 x 5\" 15x   LTR 5\" 10x  5\" 15x  5\" 15x 5\" 15x   Bridges 5\" 15x  5\" 15 x 5\" 15x    LAQ 2#  20 x 3# 20x  5\" 15 x 5\" 15x 2# 20x   Add squeze 5\" 20 x 5\" 20x 3\" 20 x 3\" 20 x 5\" 20x   Supine bent knee abd Green 20 x  Green 20 x 3\" Green 20 x 3\"     Stand marching 20x   2 x 20     Ther Activity        Sit to stand 20 x 20x 20 x 20 x 20x           Gait Training        Walk in the clinic cuing for increasing step length and ankle dorsiflexion 100ft  3 x  150ft x2 100ft x 3 100 ft 2 x 100 ft x2           Modalities                                   "